# Patient Record
Sex: FEMALE | Race: WHITE | NOT HISPANIC OR LATINO | URBAN - METROPOLITAN AREA
[De-identification: names, ages, dates, MRNs, and addresses within clinical notes are randomized per-mention and may not be internally consistent; named-entity substitution may affect disease eponyms.]

---

## 2018-06-07 ENCOUNTER — EMERGENCY (EMERGENCY)
Facility: HOSPITAL | Age: 33
LOS: 1 days | Discharge: HOME | End: 2018-06-07
Attending: EMERGENCY MEDICINE | Admitting: EMERGENCY MEDICINE
Payer: COMMERCIAL

## 2018-06-07 VITALS
TEMPERATURE: 98 F | RESPIRATION RATE: 18 BRPM | DIASTOLIC BLOOD PRESSURE: 77 MMHG | WEIGHT: 151.02 LBS | OXYGEN SATURATION: 100 % | HEART RATE: 60 BPM | SYSTOLIC BLOOD PRESSURE: 122 MMHG

## 2018-06-07 VITALS
SYSTOLIC BLOOD PRESSURE: 100 MMHG | RESPIRATION RATE: 18 BRPM | DIASTOLIC BLOOD PRESSURE: 54 MMHG | TEMPERATURE: 98 F | HEART RATE: 50 BPM | OXYGEN SATURATION: 100 %

## 2018-06-07 LAB
ALBUMIN SERPL ELPH-MCNC: 4.8 G/DL — SIGNIFICANT CHANGE UP (ref 3.3–5)
ALP SERPL-CCNC: 42 U/L — SIGNIFICANT CHANGE UP (ref 40–120)
ALT FLD-CCNC: 13 U/L — SIGNIFICANT CHANGE UP (ref 10–45)
ANION GAP SERPL CALC-SCNC: 9 MMOL/L — SIGNIFICANT CHANGE UP (ref 5–17)
APPEARANCE UR: CLEAR — SIGNIFICANT CHANGE UP
AST SERPL-CCNC: 21 U/L — SIGNIFICANT CHANGE UP (ref 10–40)
BASOPHILS NFR BLD AUTO: 0.3 % — SIGNIFICANT CHANGE UP (ref 0–2)
BILIRUB SERPL-MCNC: 0.8 MG/DL — SIGNIFICANT CHANGE UP (ref 0.2–1.2)
BILIRUB UR-MCNC: NEGATIVE — SIGNIFICANT CHANGE UP
BUN SERPL-MCNC: 6 MG/DL — LOW (ref 7–23)
CALCIUM SERPL-MCNC: 9.5 MG/DL — SIGNIFICANT CHANGE UP (ref 8.4–10.5)
CHLORIDE SERPL-SCNC: 104 MMOL/L — SIGNIFICANT CHANGE UP (ref 96–108)
CO2 SERPL-SCNC: 27 MMOL/L — SIGNIFICANT CHANGE UP (ref 22–31)
COLOR SPEC: YELLOW — SIGNIFICANT CHANGE UP
CREAT SERPL-MCNC: 0.66 MG/DL — SIGNIFICANT CHANGE UP (ref 0.5–1.3)
DIFF PNL FLD: NEGATIVE — SIGNIFICANT CHANGE UP
EOSINOPHIL NFR BLD AUTO: 3.2 % — SIGNIFICANT CHANGE UP (ref 0–6)
GLUCOSE SERPL-MCNC: 94 MG/DL — SIGNIFICANT CHANGE UP (ref 70–99)
GLUCOSE UR QL: NEGATIVE — SIGNIFICANT CHANGE UP
HCT VFR BLD CALC: 38.7 % — SIGNIFICANT CHANGE UP (ref 34.5–45)
HGB BLD-MCNC: 13.4 G/DL — SIGNIFICANT CHANGE UP (ref 11.5–15.5)
KETONES UR-MCNC: NEGATIVE — SIGNIFICANT CHANGE UP
LEUKOCYTE ESTERASE UR-ACNC: NEGATIVE — SIGNIFICANT CHANGE UP
LYMPHOCYTES # BLD AUTO: 14.8 % — SIGNIFICANT CHANGE UP (ref 13–44)
MCHC RBC-ENTMCNC: 30.2 PG — SIGNIFICANT CHANGE UP (ref 27–34)
MCHC RBC-ENTMCNC: 34.6 G/DL — SIGNIFICANT CHANGE UP (ref 32–36)
MCV RBC AUTO: 87.2 FL — SIGNIFICANT CHANGE UP (ref 80–100)
MONOCYTES NFR BLD AUTO: 6.4 % — SIGNIFICANT CHANGE UP (ref 2–14)
NEUTROPHILS NFR BLD AUTO: 75.3 % — SIGNIFICANT CHANGE UP (ref 43–77)
NITRITE UR-MCNC: NEGATIVE — SIGNIFICANT CHANGE UP
PH UR: 6.5 — SIGNIFICANT CHANGE UP (ref 5–8)
PLATELET # BLD AUTO: 227 K/UL — SIGNIFICANT CHANGE UP (ref 150–400)
POTASSIUM SERPL-MCNC: 4.3 MMOL/L — SIGNIFICANT CHANGE UP (ref 3.5–5.3)
POTASSIUM SERPL-SCNC: 4.3 MMOL/L — SIGNIFICANT CHANGE UP (ref 3.5–5.3)
PROT SERPL-MCNC: 7.2 G/DL — SIGNIFICANT CHANGE UP (ref 6–8.3)
PROT UR-MCNC: NEGATIVE MG/DL — SIGNIFICANT CHANGE UP
RBC # BLD: 4.44 M/UL — SIGNIFICANT CHANGE UP (ref 3.8–5.2)
RBC # FLD: 12.5 % — SIGNIFICANT CHANGE UP (ref 10.3–16.9)
SODIUM SERPL-SCNC: 140 MMOL/L — SIGNIFICANT CHANGE UP (ref 135–145)
SP GR SPEC: <=1.005 — SIGNIFICANT CHANGE UP (ref 1–1.03)
UROBILINOGEN FLD QL: 0.2 E.U./DL — SIGNIFICANT CHANGE UP
WBC # BLD: 6.3 K/UL — SIGNIFICANT CHANGE UP (ref 3.8–10.5)
WBC # FLD AUTO: 6.3 K/UL — SIGNIFICANT CHANGE UP (ref 3.8–10.5)

## 2018-06-07 PROCEDURE — 36415 COLL VENOUS BLD VENIPUNCTURE: CPT

## 2018-06-07 PROCEDURE — 80053 COMPREHEN METABOLIC PANEL: CPT

## 2018-06-07 PROCEDURE — 85025 COMPLETE CBC W/AUTO DIFF WBC: CPT

## 2018-06-07 PROCEDURE — 99283 EMERGENCY DEPT VISIT LOW MDM: CPT | Mod: 25

## 2018-06-07 PROCEDURE — 93005 ELECTROCARDIOGRAM TRACING: CPT

## 2018-06-07 PROCEDURE — 99284 EMERGENCY DEPT VISIT MOD MDM: CPT | Mod: 25

## 2018-06-07 PROCEDURE — 81003 URINALYSIS AUTO W/O SCOPE: CPT

## 2018-06-07 PROCEDURE — 93010 ELECTROCARDIOGRAM REPORT: CPT

## 2018-06-07 PROCEDURE — 84702 CHORIONIC GONADOTROPIN TEST: CPT

## 2018-06-07 RX ORDER — OMEPRAZOLE 10 MG/1
1 CAPSULE, DELAYED RELEASE ORAL
Qty: 30 | Refills: 0 | OUTPATIENT
Start: 2018-06-07 | End: 2018-07-06

## 2018-06-07 RX ORDER — LIDOCAINE 4 G/100G
10 CREAM TOPICAL ONCE
Qty: 0 | Refills: 0 | Status: COMPLETED | OUTPATIENT
Start: 2018-06-07 | End: 2018-06-07

## 2018-06-07 RX ADMIN — Medication 30 MILLILITER(S): at 10:19

## 2018-06-07 RX ADMIN — LIDOCAINE 10 MILLILITER(S): 4 CREAM TOPICAL at 10:19

## 2018-06-07 NOTE — ED ADULT TRIAGE NOTE - CHIEF COMPLAINT QUOTE
c/o right rib pain accompanied with throat tightness since this morning. Pt states "feels like indigestion."

## 2018-06-07 NOTE — ED ADULT NURSE NOTE - OBJECTIVE STATEMENT
Pt requesting Med Eval.  Pt states "I've been feeling run down and fatigued for the past month and now I have a throbbing in my throat but it feels like indigestion."  PT denies N/V/D, SOB, Fevers, CP and Dizziness.

## 2018-06-07 NOTE — ED PROVIDER NOTE - OBJECTIVE STATEMENT
32 y/o f no pmh presents c/o discomfort in upper abdomen, radiating up her chest which began early this morning.  Pt also reporting pain under her rib on right side of upper abdomen, which she felt this morning, but has since improved.  Denies fever, chills, n/v/d, SOB, dysuria, all other ROS negative.

## 2018-06-07 NOTE — ED PROVIDER NOTE - MEDICAL DECISION MAKING DETAILS
32 y/o f upper abd pain this morning; currently abd nontender, EKG nonischemic, possible gastritis/GERD, unlikely ACS, will check labs, give GI cocktail and reassess.

## 2018-06-07 NOTE — ED PROVIDER NOTE - ATTENDING CONTRIBUTION TO CARE
34 y/o f with no sig. PMHX, not feeling self, less energy. Upper abd pain radiating to throat. No N/V. Recently diagnosed with fibroids on MRI of abd. Currently on exam, non tender. check lab and urine status.

## 2018-06-11 DIAGNOSIS — R10.11 RIGHT UPPER QUADRANT PAIN: ICD-10-CM

## 2018-06-11 DIAGNOSIS — R10.9 UNSPECIFIED ABDOMINAL PAIN: ICD-10-CM

## 2018-06-11 DIAGNOSIS — Z79.899 OTHER LONG TERM (CURRENT) DRUG THERAPY: ICD-10-CM

## 2018-08-24 ENCOUNTER — APPOINTMENT (OUTPATIENT)
Dept: SURGERY | Facility: CLINIC | Age: 33
End: 2018-08-24

## 2018-08-24 ENCOUNTER — OUTPATIENT (OUTPATIENT)
Dept: OUTPATIENT SERVICES | Facility: HOSPITAL | Age: 33
LOS: 1 days | End: 2018-08-24
Payer: COMMERCIAL

## 2018-08-24 ENCOUNTER — TRANSCRIPTION ENCOUNTER (OUTPATIENT)
Age: 33
End: 2018-08-24

## 2018-08-24 VITALS
SYSTOLIC BLOOD PRESSURE: 124 MMHG | OXYGEN SATURATION: 100 % | DIASTOLIC BLOOD PRESSURE: 80 MMHG | WEIGHT: 149.91 LBS | TEMPERATURE: 98 F | HEIGHT: 67 IN | RESPIRATION RATE: 16 BRPM | HEART RATE: 56 BPM

## 2018-08-24 DIAGNOSIS — Z41.9 ENCOUNTER FOR PROCEDURE FOR PURPOSES OTHER THAN REMEDYING HEALTH STATE, UNSPECIFIED: Chronic | ICD-10-CM

## 2018-08-24 DIAGNOSIS — N80.9 ENDOMETRIOSIS, UNSPECIFIED: ICD-10-CM

## 2018-08-24 DIAGNOSIS — Z01.818 ENCOUNTER FOR OTHER PREPROCEDURAL EXAMINATION: ICD-10-CM

## 2018-08-24 PROBLEM — Z00.00 ENCOUNTER FOR PREVENTIVE HEALTH EXAMINATION: Status: ACTIVE | Noted: 2018-08-24

## 2018-08-24 LAB
ALBUMIN SERPL ELPH-MCNC: 5.1 G/DL — HIGH (ref 3.3–5)
ALP SERPL-CCNC: 43 U/L — SIGNIFICANT CHANGE UP (ref 40–120)
ALT FLD-CCNC: 11 U/L — SIGNIFICANT CHANGE UP (ref 10–45)
ANION GAP SERPL CALC-SCNC: 15 MMOL/L — SIGNIFICANT CHANGE UP (ref 5–17)
APPEARANCE UR: CLEAR — SIGNIFICANT CHANGE UP
APTT BLD: 28.1 SEC — SIGNIFICANT CHANGE UP (ref 27.5–37.4)
AST SERPL-CCNC: 15 U/L — SIGNIFICANT CHANGE UP (ref 10–40)
BILIRUB SERPL-MCNC: 0.4 MG/DL — SIGNIFICANT CHANGE UP (ref 0.2–1.2)
BILIRUB UR-MCNC: NEGATIVE — SIGNIFICANT CHANGE UP
BLD GP AB SCN SERPL QL: NEGATIVE — SIGNIFICANT CHANGE UP
BUN SERPL-MCNC: 15 MG/DL — SIGNIFICANT CHANGE UP (ref 7–23)
CALCIUM SERPL-MCNC: 10 MG/DL — SIGNIFICANT CHANGE UP (ref 8.4–10.5)
CHLORIDE SERPL-SCNC: 101 MMOL/L — SIGNIFICANT CHANGE UP (ref 96–108)
CO2 SERPL-SCNC: 22 MMOL/L — SIGNIFICANT CHANGE UP (ref 22–31)
COLOR SPEC: YELLOW — SIGNIFICANT CHANGE UP
CREAT SERPL-MCNC: 0.74 MG/DL — SIGNIFICANT CHANGE UP (ref 0.5–1.3)
DIFF PNL FLD: NEGATIVE — SIGNIFICANT CHANGE UP
GLUCOSE SERPL-MCNC: 73 MG/DL — SIGNIFICANT CHANGE UP (ref 70–99)
GLUCOSE UR QL: NEGATIVE — SIGNIFICANT CHANGE UP
HCG SERPL-ACNC: <.1 MIU/ML — SIGNIFICANT CHANGE UP
HCT VFR BLD CALC: 40.1 % — SIGNIFICANT CHANGE UP (ref 34.5–45)
HGB BLD-MCNC: 13.3 G/DL — SIGNIFICANT CHANGE UP (ref 11.5–15.5)
INR BLD: 0.96 — SIGNIFICANT CHANGE UP (ref 0.88–1.16)
KETONES UR-MCNC: NEGATIVE — SIGNIFICANT CHANGE UP
LEUKOCYTE ESTERASE UR-ACNC: NEGATIVE — SIGNIFICANT CHANGE UP
MCHC RBC-ENTMCNC: 29.8 PG — SIGNIFICANT CHANGE UP (ref 27–34)
MCHC RBC-ENTMCNC: 33.2 G/DL — SIGNIFICANT CHANGE UP (ref 32–36)
MCV RBC AUTO: 89.9 FL — SIGNIFICANT CHANGE UP (ref 80–100)
NITRITE UR-MCNC: NEGATIVE — SIGNIFICANT CHANGE UP
PH UR: 6 — SIGNIFICANT CHANGE UP (ref 5–8)
PLATELET # BLD AUTO: 261 K/UL — SIGNIFICANT CHANGE UP (ref 150–400)
POTASSIUM SERPL-MCNC: 4.4 MMOL/L — SIGNIFICANT CHANGE UP (ref 3.5–5.3)
POTASSIUM SERPL-SCNC: 4.4 MMOL/L — SIGNIFICANT CHANGE UP (ref 3.5–5.3)
PROT SERPL-MCNC: 7.5 G/DL — SIGNIFICANT CHANGE UP (ref 6–8.3)
PROT UR-MCNC: NEGATIVE MG/DL — SIGNIFICANT CHANGE UP
PROTHROM AB SERPL-ACNC: 10.6 SEC — SIGNIFICANT CHANGE UP (ref 9.8–12.7)
RBC # BLD: 4.46 M/UL — SIGNIFICANT CHANGE UP (ref 3.8–5.2)
RBC # FLD: 12.4 % — SIGNIFICANT CHANGE UP (ref 10.3–16.9)
RH IG SCN BLD-IMP: POSITIVE — SIGNIFICANT CHANGE UP
SODIUM SERPL-SCNC: 138 MMOL/L — SIGNIFICANT CHANGE UP (ref 135–145)
SP GR SPEC: 1.02 — SIGNIFICANT CHANGE UP (ref 1–1.03)
UROBILINOGEN FLD QL: 0.2 E.U./DL — SIGNIFICANT CHANGE UP
WBC # BLD: 4.5 K/UL — SIGNIFICANT CHANGE UP (ref 3.8–10.5)
WBC # FLD AUTO: 4.5 K/UL — SIGNIFICANT CHANGE UP (ref 3.8–10.5)

## 2018-08-24 PROCEDURE — 93010 ELECTROCARDIOGRAM REPORT: CPT

## 2018-08-24 RX ORDER — VALACYCLOVIR 500 MG/1
0 TABLET, FILM COATED ORAL
Qty: 0 | Refills: 0 | COMMUNITY

## 2018-08-24 NOTE — H&P PST ADULT - NSANTHOSAYNRD_GEN_A_CORE
No. TERRIE screening performed.  STOP BANG Legend: 0-2 = LOW Risk; 3-4 = INTERMEDIATE Risk; 5-8 = HIGH Risk

## 2018-08-24 NOTE — H&P PST ADULT - ASSESSMENT
No medical contraindication to operative hysteroscopy and laparoscopic excision of endometriosis, pending normal labs.

## 2018-08-25 LAB — CANCER AG125 SERPL-ACNC: 5 U/ML — SIGNIFICANT CHANGE UP

## 2018-08-26 LAB
CULTURE RESULTS: NO GROWTH — SIGNIFICANT CHANGE UP
SPECIMEN SOURCE: SIGNIFICANT CHANGE UP

## 2018-08-27 LAB — ANTI-MULLERIAN HORMONE: 1.73 NG/ML — SIGNIFICANT CHANGE UP

## 2018-09-05 PROBLEM — B00.9 HERPESVIRAL INFECTION, UNSPECIFIED: Chronic | Status: ACTIVE | Noted: 2018-08-24

## 2018-09-05 PROBLEM — K21.9 GASTRO-ESOPHAGEAL REFLUX DISEASE WITHOUT ESOPHAGITIS: Chronic | Status: ACTIVE | Noted: 2018-08-24

## 2018-09-05 PROBLEM — G03.9 MENINGITIS, UNSPECIFIED: Chronic | Status: ACTIVE | Noted: 2018-08-24

## 2018-09-07 ENCOUNTER — OUTPATIENT (OUTPATIENT)
Dept: OUTPATIENT SERVICES | Facility: HOSPITAL | Age: 33
LOS: 1 days | Discharge: HOME | End: 2018-09-07
Payer: COMMERCIAL

## 2018-09-07 VITALS
RESPIRATION RATE: 16 BRPM | HEIGHT: 67 IN | TEMPERATURE: 99 F | SYSTOLIC BLOOD PRESSURE: 118 MMHG | HEART RATE: 48 BPM | WEIGHT: 147.93 LBS | OXYGEN SATURATION: 100 % | DIASTOLIC BLOOD PRESSURE: 56 MMHG

## 2018-09-07 DIAGNOSIS — Z41.9 ENCOUNTER FOR PROCEDURE FOR PURPOSES OTHER THAN REMEDYING HEALTH STATE, UNSPECIFIED: Chronic | ICD-10-CM

## 2018-09-07 LAB — HCG SERPL-ACNC: 17.9 MIU/ML — HIGH

## 2018-09-07 PROCEDURE — 86850 RBC ANTIBODY SCREEN: CPT

## 2018-09-07 PROCEDURE — 86901 BLOOD TYPING SEROLOGIC RH(D): CPT

## 2018-09-07 PROCEDURE — 86900 BLOOD TYPING SEROLOGIC ABO: CPT

## 2018-09-07 PROCEDURE — 36415 COLL VENOUS BLD VENIPUNCTURE: CPT

## 2018-09-07 PROCEDURE — 84702 CHORIONIC GONADOTROPIN TEST: CPT

## 2018-09-07 NOTE — PROGRESS NOTE ADULT - SUBJECTIVE AND OBJECTIVE BOX
Patient initially presented for scheduled diagnostic laparoscopy and excision of endometriosis for history of infertility. Initial urine pregnancy test was positive followed by a serum bHCG of 17. Surgery cancelled secondary to positive bHCG. Patient to return to [facility] ED after 48 hours for repeat bHCG. Pending results, will either follow up outpatient or be rescheduled for surgery.

## 2018-09-09 ENCOUNTER — EMERGENCY (EMERGENCY)
Facility: HOSPITAL | Age: 33
LOS: 1 days | Discharge: HOME | End: 2018-09-09
Attending: EMERGENCY MEDICINE | Admitting: EMERGENCY MEDICINE
Payer: COMMERCIAL

## 2018-09-09 VITALS
SYSTOLIC BLOOD PRESSURE: 111 MMHG | WEIGHT: 147.05 LBS | HEART RATE: 76 BPM | RESPIRATION RATE: 16 BRPM | OXYGEN SATURATION: 97 % | DIASTOLIC BLOOD PRESSURE: 69 MMHG | TEMPERATURE: 99 F

## 2018-09-09 DIAGNOSIS — Z41.9 ENCOUNTER FOR PROCEDURE FOR PURPOSES OTHER THAN REMEDYING HEALTH STATE, UNSPECIFIED: Chronic | ICD-10-CM

## 2018-09-09 DIAGNOSIS — O26.891 OTHER SPECIFIED PREGNANCY RELATED CONDITIONS, FIRST TRIMESTER: ICD-10-CM

## 2018-09-09 DIAGNOSIS — Z3A.00 WEEKS OF GESTATION OF PREGNANCY NOT SPECIFIED: ICD-10-CM

## 2018-09-09 DIAGNOSIS — Z79.899 OTHER LONG TERM (CURRENT) DRUG THERAPY: ICD-10-CM

## 2018-09-09 DIAGNOSIS — R10.9 UNSPECIFIED ABDOMINAL PAIN: ICD-10-CM

## 2018-09-09 DIAGNOSIS — R11.0 NAUSEA: ICD-10-CM

## 2018-09-09 LAB — HCG SERPL-ACNC: 77.3 MIU/ML — HIGH

## 2018-09-09 PROCEDURE — 99284 EMERGENCY DEPT VISIT MOD MDM: CPT

## 2018-09-09 PROCEDURE — 84702 CHORIONIC GONADOTROPIN TEST: CPT

## 2018-09-09 PROCEDURE — 99284 EMERGENCY DEPT VISIT MOD MDM: CPT | Mod: 25

## 2018-09-09 NOTE — ED PROVIDER NOTE - MEDICAL DECISION MAKING DETAILS
here for repeat hcg/ abd discomfort in early pregnancy.  hcg trending up but still very low.  no bleeding.  gyn consulted. did bedside us and felt too early to see anything on formal us.  to f/u in 2 d for repeat with her gyn or in ED

## 2018-09-09 NOTE — ED PROVIDER NOTE - OBJECTIVE STATEMENT
here for repeat hcg.  Was supposed to have laparoscopic surgery with gyn last week but prior to procedure had + pregnancy test.  Told to come back today to see if trending up or down to determine if surgery should continue or not.  Notes some mild cramping/nausea today.  No bleeding, fever.

## 2018-09-09 NOTE — ED ADULT NURSE NOTE - PMH
Acid reflux    Endometriosis    Herpes simplex  genital- last episode 8/20/2018  Meningitis  2011- viral meningitis

## 2018-09-09 NOTE — ED ADULT NURSE NOTE - NSIMPLEMENTINTERV_GEN_ALL_ED
Implemented All Universal Safety Interventions:  [place] to call system. Call bell, personal items and telephone within reach. Instruct patient to call for assistance. Room bathroom lighting operational. Non-slip footwear when patient is off stretcher. Physically safe environment: no spills, clutter or unnecessary equipment. Stretcher in lowest position, wheels locked, appropriate side rails in place.

## 2018-09-09 NOTE — CONSULT NOTE ADULT - SUBJECTIVE AND OBJECTIVE BOX
34 yo G1 at 3w6 days by LMP 8/13/18 presents for evaluation and repeat bHCG. The patient was scheduled for diagnostic hysteroscopy and laparoscopy on 9/7 with Dr. Dela Cruz. UPT and serum bHCG were both positive in Pre-OP and so the surgery was cancelled. Today the patient reports nausea and some cramping but overall feels well. She denies fever, chills, chest pain, SOB, abdominal pain, vomiting, and vaginal bleeding.       OBHx: G1 - current; LMP 8/13/18  GYN Hx: Endometriosis, fibroids  PMHx: denies   SHx: [place] teeth extraction  Meds: Denies   Allergies: NKDA     PHYSICAL EXAM:   Vital Signs Last 24 Hrs  T(C): 37 (09 Sep 2018 13:56), Max: 37 (09 Sep 2018 13:56)  T(F): 98.6 (09 Sep 2018 13:56), Max: 98.6 (09 Sep 2018 13:56)  HR: 76 (09 Sep 2018 13:56) (76 - 76)  BP: 111/69 (09 Sep 2018 13:56) (111/69 - 111/69)  BP(mean): --  RR: 16 (09 Sep 2018 13:56) (16 - 16)  SpO2: 97% (09 Sep 2018 13:56) (97% - 97%)    **************************  Constitutional: Alert & Oriented x3, No acute distress, resting comfortably   Gastrointestinal: soft, non tender, positive bowel sounds, no rebound or guarding   Pelvic exam: normal appearing external female genitalia, normal vagina, normal appearing cervix that appears closed without lesions, no abnormal discharge   Extremities: no calf tenderness or swelling      LABS:  HCG Quantitative, Serum: 77.3 mIU/mL (09-09 @ 14:40)  HCG Quantitative, Serum (09.07.18 @ 07:17)    HCG Quantitative, Serum: 17.9:     TVUS performed at bedside which demonstrates normal appearing uterus with closed cervix.

## 2018-09-09 NOTE — ED ADULT NURSE NOTE - CAS TRG GEN SKIN COLOR
Request for: fluoxetine 20 mg cap, take 1 cap daily  Last prescribed: 10/5/17   #30   Refills: 5    Refill denied.  Should have enough meds through 4/2018.  
Normal for race

## 2018-09-09 NOTE — ED ADULT TRIAGE NOTE - CHIEF COMPLAINT QUOTE
Pt was scheduled for surgery w Dr Dela Cruz but was found to be pregnant, here to have repeat HCG. Denies vag bleeding , reports pelvic pain 2/10. LMP 18. . Hx endometriosis.

## 2018-09-09 NOTE — CONSULT NOTE ADULT - ASSESSMENT
Assessment and Plan:   34 yo G1 at 3w6 by LMP presents for follow up bHCG.   - Appropriate interval rise in bHCG  - Recommend repeat bHCG in 48 hours  - Patient given strict return precautions: heavy vaginal bleeding, fever, severe abdominal pain   - Patient counseled that she is likely very early in her gestation and that there is still risk of miscarriage   - Recommend prenatal vitamins, abstinence from alcohol and raw fish/meat    Discussed with Dr. Dela Cruz

## 2018-09-09 NOTE — ED ADULT NURSE NOTE - OBJECTIVE STATEMENT
33y F, A&ox3, presents to ed for repeat hcg. states "I was supposed to get laproscopic surgery for my endometriosis but they found I was pregnant and I got blood work and told me to come back" no vaginal bleeding. minimal cramps as per pt. reports mild nausea. no urinary s/s. no cp, no sob, no fever, no chills. NAD. will continue to monitor.

## 2018-09-11 ENCOUNTER — EMERGENCY (EMERGENCY)
Facility: HOSPITAL | Age: 33
LOS: 1 days | Discharge: HOME | End: 2018-09-11
Attending: EMERGENCY MEDICINE | Admitting: EMERGENCY MEDICINE
Payer: COMMERCIAL

## 2018-09-11 VITALS
TEMPERATURE: 98 F | HEIGHT: 67 IN | HEART RATE: 58 BPM | WEIGHT: 147.93 LBS | SYSTOLIC BLOOD PRESSURE: 106 MMHG | RESPIRATION RATE: 18 BRPM | DIASTOLIC BLOOD PRESSURE: 62 MMHG | OXYGEN SATURATION: 100 %

## 2018-09-11 DIAGNOSIS — Z3A.01 LESS THAN 8 WEEKS GESTATION OF PREGNANCY: ICD-10-CM

## 2018-09-11 DIAGNOSIS — Z79.899 OTHER LONG TERM (CURRENT) DRUG THERAPY: ICD-10-CM

## 2018-09-11 DIAGNOSIS — Z41.9 ENCOUNTER FOR PROCEDURE FOR PURPOSES OTHER THAN REMEDYING HEALTH STATE, UNSPECIFIED: Chronic | ICD-10-CM

## 2018-09-11 DIAGNOSIS — R10.31 RIGHT LOWER QUADRANT PAIN: ICD-10-CM

## 2018-09-11 DIAGNOSIS — O26.891 OTHER SPECIFIED PREGNANCY RELATED CONDITIONS, FIRST TRIMESTER: ICD-10-CM

## 2018-09-11 DIAGNOSIS — R10.32 LEFT LOWER QUADRANT PAIN: ICD-10-CM

## 2018-09-11 PROBLEM — N80.9 ENDOMETRIOSIS, UNSPECIFIED: Chronic | Status: ACTIVE | Noted: 2018-09-09

## 2018-09-11 LAB — HCG SERPL-ACNC: 198.6 MIU/ML — HIGH

## 2018-09-11 PROCEDURE — 99283 EMERGENCY DEPT VISIT LOW MDM: CPT

## 2018-09-11 PROCEDURE — 36415 COLL VENOUS BLD VENIPUNCTURE: CPT

## 2018-09-11 PROCEDURE — 99284 EMERGENCY DEPT VISIT MOD MDM: CPT | Mod: 25

## 2018-09-11 PROCEDURE — 84702 CHORIONIC GONADOTROPIN TEST: CPT

## 2018-09-11 NOTE — ED ADULT TRIAGE NOTE - CHIEF COMPLAINT QUOTE
pt was for surgery last Friday for endometriosis was canceled to due positive in pregnancy test, sent here by her doctor for beta hcg blood exam, LMP 08/13/18

## 2018-09-11 NOTE — ED PROVIDER NOTE - MEDICAL DECISION MAKING DETAILS
34 y/o f presents for f/u hcg after incidental finding pre-operatively; no abd tenderness, hcg jimena from 77 to 198 in 2 days.  Discussed with GYN, hcg rising appropriately, still early in pregnancy, will d/c to f/u with Dr. Dela Cruz in 1 week, return to ED if develops pain and/or vaginal bleeding.

## 2018-09-11 NOTE — ED ADULT NURSE NOTE - OBJECTIVE STATEMENT
Pt is here for repeat beta HCG - was supposed to have a surgery and was found to be pregnant. . Endorses pelvic pain, but denies any vag bleeding or spotting.

## 2018-09-11 NOTE — ED ADULT NURSE NOTE - NSIMPLEMENTINTERV_GEN_ALL_ED
Implemented All Universal Safety Interventions:  [place] to call system. Call bell, personal items and telephone within reach. Instruct patient to call for assistance. Room bathroom lighting operational. Non-slip footwear when patient is off stretcher. Physically safe environment: no spills, clutter or unnecessary equipment. Stretcher in lowest position, wheels locked, appropriate side rails in place.

## 2018-09-11 NOTE — ED PROVIDER NOTE - ATTENDING CONTRIBUTION TO CARE
32 yo F presents stating that she is following up for a repeat quant beta hcg. Pt reporting mild b/l cramping lower abd pain which has been ongoing for a few days. Denies fever, chills, n/v, dysuria, vaginal bleeding, all other ROS negative. Pt AAO, NAD, abd: soft/ NT.  Pt's beta hcg jimena from 77 to 198 in 2 days. Discussed with GYN, hcg rising appropriately, still early in pregnancy, will d/c to f/u with Dr. Dela Cruz in 1 week, return to ED if develops pain and/or vaginal bleeding. VSS.

## 2018-09-11 NOTE — ED PROVIDER NOTE - OBJECTIVE STATEMENT
34 y/o f presents stating she is following up for a repeat pregnancy hormone level.  Pt was incidentally positive last week prior to undergoing laparoscopic hysteroscopy for endometriosis w/u.  Hormone level slowly rising and pt returning today as asked.  Pt reporting mild b/l cramping lower abd pain which is not new.  Denies fever, chills, n/v, dysuria, vaginal bleeding, all other ROS negative.

## 2018-09-13 NOTE — H&P PST ADULT - VENOUS THROMBOEMBOLISM FOR WOMEN ONLY
Paige Davis's goals for this visit include:   Chief Complaint   Patient presents with     Consult     arm pain  on right side      She requests these members of her care team be copied on today's visit information: PCP    PCP: Vinayak Huerta    Referring Provider:  Jackelyn Escalona [place]  [address] [place] [address]    BP (!) 155/112 (BP Location: Left arm, Patient Position: Sitting, Cuff Size: Adult Regular)  Pulse 90  Ht 1.524 m (5')  Wt 83.5 kg (184 lb)  SpO2 96%  BMI 35.94 kg/m2    Do you need any medication refills at today's visit? n   none

## 2018-09-24 NOTE — ED PROVIDER NOTE - CHIEF COMPLAINT
Fax from a company called [employer] asking to refill her meds / called pt to see if this is something she would like us to do/ left message for her to call back The patient is a 33y Female complaining of abdominal pain.

## 2018-10-04 ENCOUNTER — TRANSCRIPTION ENCOUNTER (OUTPATIENT)
Age: 33
End: 2018-10-04

## 2018-11-02 NOTE — ED PROVIDER NOTE - PSYCHIATRIC, MLM
Alert and oriented to person, place, time/situation. normal mood and affect. no apparent risk to self or others. Follow instructions as per MD

## 2018-12-21 DIAGNOSIS — N80.9 ENDOMETRIOSIS, UNSPECIFIED: ICD-10-CM

## 2019-05-20 ENCOUNTER — OUTPATIENT (OUTPATIENT)
Dept: OUTPATIENT SERVICES | Facility: HOSPITAL | Age: 34
LOS: 1 days | End: 2019-05-20
Payer: COMMERCIAL

## 2019-05-20 DIAGNOSIS — Z3A.00 WEEKS OF GESTATION OF PREGNANCY NOT SPECIFIED: ICD-10-CM

## 2019-05-20 DIAGNOSIS — O26.899 OTHER SPECIFIED PREGNANCY RELATED CONDITIONS, UNSPECIFIED TRIMESTER: ICD-10-CM

## 2019-05-20 DIAGNOSIS — Z41.9 ENCOUNTER FOR PROCEDURE FOR PURPOSES OTHER THAN REMEDYING HEALTH STATE, UNSPECIFIED: Chronic | ICD-10-CM

## 2019-05-20 PROCEDURE — 99214 OFFICE O/P EST MOD 30 MIN: CPT

## 2019-05-29 ENCOUNTER — INPATIENT (INPATIENT)
Facility: HOSPITAL | Age: 34
LOS: 2 days | Discharge: HOME | End: 2019-06-01
Attending: OBSTETRICS & GYNECOLOGY | Admitting: OBSTETRICS & GYNECOLOGY
Payer: COMMERCIAL

## 2019-05-29 ENCOUNTER — RESULT REVIEW (OUTPATIENT)
Age: 34
End: 2019-05-29

## 2019-05-29 VITALS — WEIGHT: 178.57 LBS | HEIGHT: 67 IN

## 2019-05-29 DIAGNOSIS — Z41.9 ENCOUNTER FOR PROCEDURE FOR PURPOSES OTHER THAN REMEDYING HEALTH STATE, UNSPECIFIED: Chronic | ICD-10-CM

## 2019-05-29 DIAGNOSIS — O26.899 OTHER SPECIFIED PREGNANCY RELATED CONDITIONS, UNSPECIFIED TRIMESTER: ICD-10-CM

## 2019-05-29 DIAGNOSIS — Z3A.00 WEEKS OF GESTATION OF PREGNANCY NOT SPECIFIED: ICD-10-CM

## 2019-05-29 LAB
BASOPHILS # BLD AUTO: 0.02 K/UL — SIGNIFICANT CHANGE UP (ref 0–0.2)
BASOPHILS NFR BLD AUTO: 0.3 % — SIGNIFICANT CHANGE UP (ref 0–2)
EOSINOPHIL # BLD AUTO: 0.04 K/UL — SIGNIFICANT CHANGE UP (ref 0–0.5)
EOSINOPHIL NFR BLD AUTO: 0.5 % — SIGNIFICANT CHANGE UP (ref 0–6)
HCT VFR BLD CALC: 39.7 % — SIGNIFICANT CHANGE UP (ref 34.5–45)
HGB BLD-MCNC: 13.3 G/DL — SIGNIFICANT CHANGE UP (ref 11.5–15.5)
IMM GRANULOCYTES NFR BLD AUTO: 0.5 % — SIGNIFICANT CHANGE UP (ref 0–1.5)
LYMPHOCYTES # BLD AUTO: 2.01 K/UL — SIGNIFICANT CHANGE UP (ref 1–3.3)
LYMPHOCYTES # BLD AUTO: 27.1 % — SIGNIFICANT CHANGE UP (ref 13–44)
MCHC RBC-ENTMCNC: 30.2 PG — SIGNIFICANT CHANGE UP (ref 27–34)
MCHC RBC-ENTMCNC: 33.5 GM/DL — SIGNIFICANT CHANGE UP (ref 32–36)
MCV RBC AUTO: 90.2 FL — SIGNIFICANT CHANGE UP (ref 80–100)
MONOCYTES # BLD AUTO: 0.41 K/UL — SIGNIFICANT CHANGE UP (ref 0–0.9)
MONOCYTES NFR BLD AUTO: 5.5 % — SIGNIFICANT CHANGE UP (ref 2–14)
NEUTROPHILS # BLD AUTO: 4.91 K/UL — SIGNIFICANT CHANGE UP (ref 1.8–7.4)
NEUTROPHILS NFR BLD AUTO: 66.1 % — SIGNIFICANT CHANGE UP (ref 43–77)
NRBC # BLD: 0 /100 WBCS — SIGNIFICANT CHANGE UP (ref 0–0)
PLATELET # BLD AUTO: 160 K/UL — SIGNIFICANT CHANGE UP (ref 150–400)
RBC # BLD: 4.4 M/UL — SIGNIFICANT CHANGE UP (ref 3.8–5.2)
RBC # FLD: 13.6 % — SIGNIFICANT CHANGE UP (ref 10.3–14.5)
T PALLIDUM AB TITR SER: NEGATIVE — SIGNIFICANT CHANGE UP
WBC # BLD: 7.43 K/UL — SIGNIFICANT CHANGE UP (ref 3.8–10.5)
WBC # FLD AUTO: 7.43 K/UL — SIGNIFICANT CHANGE UP (ref 3.8–10.5)

## 2019-05-29 RX ORDER — SODIUM CHLORIDE 9 MG/ML
1000 INJECTION, SOLUTION INTRAVENOUS
Refills: 0 | Status: DISCONTINUED | OUTPATIENT
Start: 2019-05-29 | End: 2019-05-29

## 2019-05-29 RX ORDER — NALOXONE HYDROCHLORIDE 4 MG/.1ML
0.1 SPRAY NASAL
Refills: 0 | Status: DISCONTINUED | OUTPATIENT
Start: 2019-05-29 | End: 2019-05-29

## 2019-05-29 RX ORDER — GLYCERIN ADULT
1 SUPPOSITORY, RECTAL RECTAL AT BEDTIME
Refills: 0 | Status: DISCONTINUED | OUTPATIENT
Start: 2019-05-29 | End: 2019-06-01

## 2019-05-29 RX ORDER — CITRIC ACID/SODIUM CITRATE 300-500 MG
30 SOLUTION, ORAL ORAL ONCE
Refills: 0 | Status: COMPLETED | OUTPATIENT
Start: 2019-05-29 | End: 2019-05-29

## 2019-05-29 RX ORDER — SIMETHICONE 80 MG/1
80 TABLET, CHEWABLE ORAL EVERY 4 HOURS
Refills: 0 | Status: DISCONTINUED | OUTPATIENT
Start: 2019-05-29 | End: 2019-06-01

## 2019-05-29 RX ORDER — TETANUS TOXOID, REDUCED DIPHTHERIA TOXOID AND ACELLULAR PERTUSSIS VACCINE, ADSORBED 5; 2.5; 8; 8; 2.5 [IU]/.5ML; [IU]/.5ML; UG/.5ML; UG/.5ML; UG/.5ML
0.5 SUSPENSION INTRAMUSCULAR ONCE
Refills: 0 | Status: DISCONTINUED | OUTPATIENT
Start: 2019-05-29 | End: 2019-06-01

## 2019-05-29 RX ORDER — DIPHENHYDRAMINE HCL 50 MG
25 CAPSULE ORAL EVERY 6 HOURS
Refills: 0 | Status: DISCONTINUED | OUTPATIENT
Start: 2019-05-29 | End: 2019-06-01

## 2019-05-29 RX ORDER — OXYTOCIN 10 UNIT/ML
333.33 VIAL (ML) INJECTION
Qty: 20 | Refills: 0 | Status: COMPLETED | OUTPATIENT
Start: 2019-05-29 | End: 2019-05-29

## 2019-05-29 RX ORDER — OXYTOCIN 10 UNIT/ML
333.33 VIAL (ML) INJECTION
Qty: 20 | Refills: 0 | Status: DISCONTINUED | OUTPATIENT
Start: 2019-05-29 | End: 2019-05-29

## 2019-05-29 RX ORDER — SODIUM CHLORIDE 9 MG/ML
1000 INJECTION, SOLUTION INTRAVENOUS ONCE
Refills: 0 | Status: DISCONTINUED | OUTPATIENT
Start: 2019-05-29 | End: 2019-05-29

## 2019-05-29 RX ORDER — LANOLIN
1 OINTMENT (GRAM) TOPICAL EVERY 6 HOURS
Refills: 0 | Status: DISCONTINUED | OUTPATIENT
Start: 2019-05-29 | End: 2019-06-01

## 2019-05-29 RX ORDER — METOCLOPRAMIDE HCL 10 MG
10 TABLET ORAL ONCE
Refills: 0 | Status: DISCONTINUED | OUTPATIENT
Start: 2019-05-29 | End: 2019-05-29

## 2019-05-29 RX ORDER — HEPARIN SODIUM 5000 [USP'U]/ML
5000 INJECTION INTRAVENOUS; SUBCUTANEOUS EVERY 12 HOURS
Refills: 0 | Status: DISCONTINUED | OUTPATIENT
Start: 2019-05-29 | End: 2019-06-01

## 2019-05-29 RX ORDER — SODIUM CHLORIDE 9 MG/ML
1000 INJECTION, SOLUTION INTRAVENOUS
Refills: 0 | Status: DISCONTINUED | OUTPATIENT
Start: 2019-05-29 | End: 2019-06-01

## 2019-05-29 RX ORDER — KETOROLAC TROMETHAMINE 30 MG/ML
30 SYRINGE (ML) INJECTION EVERY 6 HOURS
Refills: 0 | Status: DISCONTINUED | OUTPATIENT
Start: 2019-05-29 | End: 2019-05-30

## 2019-05-29 RX ORDER — MORPHINE SULFATE 50 MG/1
0.3 CAPSULE, EXTENDED RELEASE ORAL ONCE
Refills: 0 | Status: DISCONTINUED | OUTPATIENT
Start: 2019-05-29 | End: 2019-05-29

## 2019-05-29 RX ORDER — MAGNESIUM HYDROXIDE 400 MG/1
30 TABLET, CHEWABLE ORAL
Refills: 0 | Status: DISCONTINUED | OUTPATIENT
Start: 2019-05-29 | End: 2019-06-01

## 2019-05-29 RX ORDER — OXYCODONE HYDROCHLORIDE 5 MG/1
5 TABLET ORAL ONCE
Refills: 0 | Status: DISCONTINUED | OUTPATIENT
Start: 2019-05-29 | End: 2019-06-01

## 2019-05-29 RX ORDER — IBUPROFEN 200 MG
600 TABLET ORAL EVERY 6 HOURS
Refills: 0 | Status: COMPLETED | OUTPATIENT
Start: 2019-05-29 | End: 2020-04-26

## 2019-05-29 RX ORDER — CEFAZOLIN SODIUM 1 G
2000 VIAL (EA) INJECTION ONCE
Refills: 0 | Status: COMPLETED | OUTPATIENT
Start: 2019-05-29 | End: 2019-05-29

## 2019-05-29 RX ORDER — FOLIC ACID 0.8 MG
1 TABLET ORAL DAILY
Refills: 0 | Status: DISCONTINUED | OUTPATIENT
Start: 2019-05-29 | End: 2019-06-01

## 2019-05-29 RX ORDER — DOCUSATE SODIUM 100 MG
100 CAPSULE ORAL
Refills: 0 | Status: DISCONTINUED | OUTPATIENT
Start: 2019-05-29 | End: 2019-06-01

## 2019-05-29 RX ORDER — ACETAMINOPHEN 500 MG
975 TABLET ORAL EVERY 6 HOURS
Refills: 0 | Status: DISCONTINUED | OUTPATIENT
Start: 2019-05-29 | End: 2019-06-01

## 2019-05-29 RX ORDER — OXYCODONE HYDROCHLORIDE 5 MG/1
5 TABLET ORAL
Refills: 0 | Status: DISCONTINUED | OUTPATIENT
Start: 2019-05-29 | End: 2019-06-01

## 2019-05-29 RX ORDER — ACETAMINOPHEN 500 MG
1000 TABLET ORAL ONCE
Refills: 0 | Status: COMPLETED | OUTPATIENT
Start: 2019-05-29 | End: 2019-05-29

## 2019-05-29 RX ORDER — ONDANSETRON 8 MG/1
4 TABLET, FILM COATED ORAL EVERY 6 HOURS
Refills: 0 | Status: DISCONTINUED | OUTPATIENT
Start: 2019-05-29 | End: 2019-05-29

## 2019-05-29 RX ORDER — FERROUS SULFATE 325(65) MG
325 TABLET ORAL DAILY
Refills: 0 | Status: DISCONTINUED | OUTPATIENT
Start: 2019-05-29 | End: 2019-06-01

## 2019-05-29 RX ADMIN — Medication 30 MILLIGRAM(S): at 22:00

## 2019-05-29 RX ADMIN — Medication 400 MILLIGRAM(S): at 11:11

## 2019-05-29 RX ADMIN — HEPARIN SODIUM 5000 UNIT(S): 5000 INJECTION INTRAVENOUS; SUBCUTANEOUS at 22:17

## 2019-05-29 RX ADMIN — Medication 30 MILLIGRAM(S): at 21:20

## 2019-05-29 RX ADMIN — Medication 100 MILLIGRAM(S): at 09:01

## 2019-05-29 RX ADMIN — Medication 30 MILLIGRAM(S): at 15:15

## 2019-05-29 RX ADMIN — Medication 1000 MILLIUNIT(S)/MIN: at 15:20

## 2019-05-29 RX ADMIN — Medication 30 MILLILITER(S): at 09:01

## 2019-05-29 RX ADMIN — Medication 30 MILLIGRAM(S): at 15:55

## 2019-05-29 RX ADMIN — Medication 1000 MILLIGRAM(S): at 12:00

## 2019-05-30 LAB
BASOPHILS # BLD AUTO: 0.02 K/UL — SIGNIFICANT CHANGE UP (ref 0–0.2)
BASOPHILS NFR BLD AUTO: 0.2 % — SIGNIFICANT CHANGE UP (ref 0–2)
EOSINOPHIL # BLD AUTO: 0.02 K/UL — SIGNIFICANT CHANGE UP (ref 0–0.5)
EOSINOPHIL NFR BLD AUTO: 0.2 % — SIGNIFICANT CHANGE UP (ref 0–6)
HCT VFR BLD CALC: 33.1 % — LOW (ref 34.5–45)
HGB BLD-MCNC: 10.6 G/DL — LOW (ref 11.5–15.5)
IMM GRANULOCYTES NFR BLD AUTO: 0.2 % — SIGNIFICANT CHANGE UP (ref 0–1.5)
LYMPHOCYTES # BLD AUTO: 1.28 K/UL — SIGNIFICANT CHANGE UP (ref 1–3.3)
LYMPHOCYTES # BLD AUTO: 15.2 % — SIGNIFICANT CHANGE UP (ref 13–44)
MCHC RBC-ENTMCNC: 29.4 PG — SIGNIFICANT CHANGE UP (ref 27–34)
MCHC RBC-ENTMCNC: 32 GM/DL — SIGNIFICANT CHANGE UP (ref 32–36)
MCV RBC AUTO: 91.7 FL — SIGNIFICANT CHANGE UP (ref 80–100)
MONOCYTES # BLD AUTO: 0.36 K/UL — SIGNIFICANT CHANGE UP (ref 0–0.9)
MONOCYTES NFR BLD AUTO: 4.3 % — SIGNIFICANT CHANGE UP (ref 2–14)
NEUTROPHILS # BLD AUTO: 6.74 K/UL — SIGNIFICANT CHANGE UP (ref 1.8–7.4)
NEUTROPHILS NFR BLD AUTO: 79.9 % — HIGH (ref 43–77)
NRBC # BLD: 0 /100 WBCS — SIGNIFICANT CHANGE UP (ref 0–0)
PLATELET # BLD AUTO: 127 K/UL — LOW (ref 150–400)
RBC # BLD: 3.61 M/UL — LOW (ref 3.8–5.2)
RBC # FLD: 13.7 % — SIGNIFICANT CHANGE UP (ref 10.3–14.5)
WBC # BLD: 8.44 K/UL — SIGNIFICANT CHANGE UP (ref 3.8–10.5)
WBC # FLD AUTO: 8.44 K/UL — SIGNIFICANT CHANGE UP (ref 3.8–10.5)

## 2019-05-30 RX ORDER — IBUPROFEN 200 MG
600 TABLET ORAL EVERY 6 HOURS
Refills: 0 | Status: DISCONTINUED | OUTPATIENT
Start: 2019-05-30 | End: 2019-06-01

## 2019-05-30 RX ADMIN — Medication 30 MILLIGRAM(S): at 01:49

## 2019-05-30 RX ADMIN — Medication 975 MILLIGRAM(S): at 14:15

## 2019-05-30 RX ADMIN — Medication 30 MILLIGRAM(S): at 02:38

## 2019-05-30 RX ADMIN — Medication 100 MILLIGRAM(S): at 13:28

## 2019-05-30 RX ADMIN — Medication 975 MILLIGRAM(S): at 13:29

## 2019-05-30 RX ADMIN — Medication 325 MILLIGRAM(S): at 13:28

## 2019-05-30 RX ADMIN — Medication 975 MILLIGRAM(S): at 19:31

## 2019-05-30 RX ADMIN — HEPARIN SODIUM 5000 UNIT(S): 5000 INJECTION INTRAVENOUS; SUBCUTANEOUS at 13:27

## 2019-05-30 RX ADMIN — Medication 975 MILLIGRAM(S): at 06:30

## 2019-05-30 RX ADMIN — Medication 975 MILLIGRAM(S): at 07:41

## 2019-05-30 RX ADMIN — Medication 600 MILLIGRAM(S): at 18:00

## 2019-05-30 RX ADMIN — Medication 600 MILLIGRAM(S): at 17:20

## 2019-05-30 RX ADMIN — Medication 30 MILLIGRAM(S): at 10:12

## 2019-05-30 RX ADMIN — Medication 30 MILLIGRAM(S): at 11:00

## 2019-05-30 RX ADMIN — Medication 975 MILLIGRAM(S): at 20:51

## 2019-05-30 RX ADMIN — Medication 1 TABLET(S): at 13:28

## 2019-05-30 RX ADMIN — Medication 1 MILLIGRAM(S): at 13:28

## 2019-05-30 NOTE — PROGRESS NOTE ADULT - SUBJECTIVE AND OBJECTIVE BOX
Patient evaluated at bedside.   She reports pain is well controlled.  She denies headache, dizziness, chest pain, palpitations, shortness of breathe, nausea, vomiting or heavy vaginal bleeding.  She has been ambulating without assistance, voiding spontaneously, tolerating regular diet and is breastfeeding. Patient reports that she feels very well overall.     Physical Exam:  Vital Signs Last 24 Hrs  T(C): 36.5 (30 May 2019 06:00), Max: 36.7 (29 May 2019 12:15)  T(F): 97.7 (30 May 2019 06:00), Max: 98.1 (29 May 2019 12:15)  HR: 60 (30 May 2019 06:00) (50 - 60)  BP: 104/62 (30 May 2019 06:00) (90/54 - 137/58)  BP(mean): 84 (29 May 2019 12:00) (74 - 86)  RR: 18 (30 May 2019 06:00) (14 - 20)  SpO2: 96% (30 May 2019 06:00) (95% - 99%)    05-29 @ 07:01  -  05-30 @ 07:00  --------------------------------------------------------  IN: 1600 mL / OUT: 2200 mL / NET: -600 mL        GA: NAD, resting comfortably   Abd: + BS, soft, appropriately tender, moderately distended, no rebound or guarding, uterus firm at midline,  fb below umbilicus  Incision: well approximated, no erythema or discharge  : lochia WNL  Extremities: no swelling or calf tenderness                            10.6   8.44  )-----------( 127      ( 30 May 2019 07:59 )             33.1

## 2019-05-30 NOTE — LACTATION INITIAL EVALUATION - NS LACT CON REASON FOR REQ
c/o sore, painful nipples/primaparous mom/Met Dyad and FOB. Parents report they are very motivated to breastfeed. Pt reports "my nipples are sore and the left breast is hard for her to stay on". L nipple noted to be dimpled and shorter than the right nipple. Taught pt how to hand express and how to gently roll and stimulate her nipple to leopoldo it further prior to latching. Also taught pt how to sandwich her breast to facilitate a deeper latch. Pt has visual bruising (hickey type bruises) in x3 spots on the R areola. It appears the baby missed the nipple completely and only latched onto the areola where the pt is c/o of pain. Nipple CDI, but both nipples are short and less elastic, so pt reports "the stretching is making me sore". Reviewed good skin hygiene and maintenance b/w feeds and provided saline soaks and instructions for saline soaks after nursing while she is healing. With baby in SSC and proper positioning, baby gaped and latched deeply bilaterally with deep rhythmic sucks and appropriate pauses. Even with the damage to pt's areolas, pt reported much less pain and a tolerable latch bilaterally during this feed. Baby nursed on the R breast for approximately 15-20 minutes and approximately 30-45 minutes on the L breast. Baby spontaneously released the breast at the end of the feed and maternal nipple came out round and CDI. Baby fell asleep after nursing on pt's chest in proper SSC with relaxed open hand body posture, and then pt fell asleep as well. FOB at BS to keep an eye on them. Taught bf'ing basics, position/latch, expected input/output. Baby born @0951, Primary C/S (d/t herpes outbreak), 41.2 wks, . x4 voids/x3 poops since birth (breast only), WNL. Wt loss=4.14%, WNL. Encouraged as much SSC and rooming in as possible aiming to FOD of baby when giving feeding cues. Provided breast shells with proper instruction of use for pt to wear while her nipples are healing. Parents plan to seek out [facility] LC support s/p D/C. Questions answered, parents reassured. BS RN aware of consult.

## 2019-05-30 NOTE — LACTATION INITIAL EVALUATION - PRO FEEDING CONCERNS YN OB
yes/[place] less elastic nipples with some nipple/areola damage already, but with proper deep sandwiching of the breast, baby was able to latch well and sustain the latch.

## 2019-05-31 RX ADMIN — Medication 975 MILLIGRAM(S): at 13:55

## 2019-05-31 RX ADMIN — Medication 975 MILLIGRAM(S): at 18:30

## 2019-05-31 RX ADMIN — Medication 325 MILLIGRAM(S): at 12:56

## 2019-05-31 RX ADMIN — Medication 600 MILLIGRAM(S): at 14:33

## 2019-05-31 RX ADMIN — SIMETHICONE 80 MILLIGRAM(S): 80 TABLET, CHEWABLE ORAL at 03:17

## 2019-05-31 RX ADMIN — Medication 975 MILLIGRAM(S): at 01:30

## 2019-05-31 RX ADMIN — HEPARIN SODIUM 5000 UNIT(S): 5000 INJECTION INTRAVENOUS; SUBCUTANEOUS at 12:56

## 2019-05-31 RX ADMIN — Medication 1 MILLIGRAM(S): at 12:55

## 2019-05-31 RX ADMIN — Medication 975 MILLIGRAM(S): at 12:55

## 2019-05-31 RX ADMIN — Medication 600 MILLIGRAM(S): at 20:30

## 2019-05-31 RX ADMIN — Medication 100 MILLIGRAM(S): at 20:28

## 2019-05-31 RX ADMIN — Medication 600 MILLIGRAM(S): at 03:14

## 2019-05-31 RX ADMIN — Medication 975 MILLIGRAM(S): at 06:39

## 2019-05-31 RX ADMIN — Medication 975 MILLIGRAM(S): at 00:44

## 2019-05-31 RX ADMIN — Medication 975 MILLIGRAM(S): at 07:24

## 2019-05-31 RX ADMIN — Medication 600 MILLIGRAM(S): at 03:44

## 2019-05-31 RX ADMIN — SIMETHICONE 80 MILLIGRAM(S): 80 TABLET, CHEWABLE ORAL at 13:00

## 2019-05-31 RX ADMIN — Medication 1 TABLET(S): at 12:56

## 2019-05-31 RX ADMIN — Medication 600 MILLIGRAM(S): at 08:32

## 2019-05-31 RX ADMIN — Medication 600 MILLIGRAM(S): at 09:30

## 2019-05-31 RX ADMIN — HEPARIN SODIUM 5000 UNIT(S): 5000 INJECTION INTRAVENOUS; SUBCUTANEOUS at 00:52

## 2019-05-31 RX ADMIN — Medication 600 MILLIGRAM(S): at 21:30

## 2019-05-31 RX ADMIN — Medication 600 MILLIGRAM(S): at 15:30

## 2019-05-31 RX ADMIN — Medication 975 MILLIGRAM(S): at 17:36

## 2019-05-31 NOTE — PROGRESS NOTE ADULT - SUBJECTIVE AND OBJECTIVE BOX
Patient evaluated at bedside.   She reports pain is well controlled with po pain   She denies headache, dizziness, chest pain, palpitations, shortness of breathe, nausea, vomiting or heavy vaginal bleeding.  She has been ambulating without assistance, voiding spontaneously, passing gas, tolerating regular diet and is breastfeeding.    Physical Exam:  Vital Signs Last 24 Hrs  T(C): 36.4 (31 May 2019 02:22), Max: 36.8 (30 May 2019 10:00)  T(F): 97.6 (31 May 2019 02:22), Max: 98.3 (30 May 2019 18:00)  HR: 60 (31 May 2019 02:22) (56 - 62)  BP: 134/80 (31 May 2019 02:22) (101/63 - 134/80)  BP(mean): --  RR: 18 (31 May 2019 02:22) (18 - 18)  SpO2: 98% (31 May 2019 02:22) (97% - 98%)    GA: NAD, A+0 x 3s  Abd: + BS, soft, nontender, nondistended, no rebound or guarding, incision clean, dry and intact, uterus firm at midline, 1 fb below umbilicus  : lochia WNL  Extremities: no swelling or calf tenderness, reflexes +2 bilaterally                            10.6   8.44  )-----------( 127      ( 30 May 2019 07:59 )             33.1

## 2019-05-31 NOTE — PROGRESS NOTE ADULT - SUBJECTIVE AND OBJECTIVE BOX
Patient evaluated at bedside.   She reports pain is well controlled.  She denies headache, dizziness, chest pain, palpitations, shortness of breathe, nausea, vomiting or heavy vaginal bleeding.  She has been ambulating without assistance, voiding spontaneously, passing gas, tolerating regular diet and is breastfeeding.    Physical Exam:  GA: NAD, resting comfortably   Abd: + BS, soft, appropriately tender, mildly distended, no rebound or guarding, uterus firm at midline,  fb below umbilicus  Incision: well approximated, no erythema or discharge  : lochia WNL  Extremities: no swelling or calf tenderness                            10.6   8.44  )-----------( 127      ( 30 May 2019 07:59 )             33.1

## 2019-06-01 ENCOUNTER — TRANSCRIPTION ENCOUNTER (OUTPATIENT)
Age: 34
End: 2019-06-01

## 2019-06-01 VITALS
TEMPERATURE: 97 F | RESPIRATION RATE: 18 BRPM | OXYGEN SATURATION: 99 % | HEART RATE: 56 BPM | DIASTOLIC BLOOD PRESSURE: 80 MMHG | SYSTOLIC BLOOD PRESSURE: 125 MMHG

## 2019-06-01 PROCEDURE — 85025 COMPLETE CBC W/AUTO DIFF WBC: CPT

## 2019-06-01 PROCEDURE — 86850 RBC ANTIBODY SCREEN: CPT

## 2019-06-01 PROCEDURE — 86901 BLOOD TYPING SEROLOGIC RH(D): CPT

## 2019-06-01 PROCEDURE — C1889: CPT

## 2019-06-01 PROCEDURE — 86780 TREPONEMA PALLIDUM: CPT

## 2019-06-01 PROCEDURE — 99214 OFFICE O/P EST MOD 30 MIN: CPT

## 2019-06-01 PROCEDURE — 88307 TISSUE EXAM BY PATHOLOGIST: CPT

## 2019-06-01 PROCEDURE — 86900 BLOOD TYPING SEROLOGIC ABO: CPT

## 2019-06-01 PROCEDURE — C1765: CPT

## 2019-06-01 PROCEDURE — 36415 COLL VENOUS BLD VENIPUNCTURE: CPT

## 2019-06-01 RX ORDER — VALACYCLOVIR 500 MG/1
0 TABLET, FILM COATED ORAL
Qty: 0 | Refills: 0 | DISCHARGE

## 2019-06-01 RX ORDER — OMEPRAZOLE 10 MG/1
0 CAPSULE, DELAYED RELEASE ORAL
Qty: 0 | Refills: 0 | DISCHARGE

## 2019-06-01 RX ADMIN — Medication 1 TABLET(S): at 12:44

## 2019-06-01 RX ADMIN — Medication 600 MILLIGRAM(S): at 03:30

## 2019-06-01 RX ADMIN — Medication 600 MILLIGRAM(S): at 02:29

## 2019-06-01 RX ADMIN — Medication 975 MILLIGRAM(S): at 12:40

## 2019-06-01 RX ADMIN — Medication 100 MILLIGRAM(S): at 12:43

## 2019-06-01 RX ADMIN — Medication 600 MILLIGRAM(S): at 09:06

## 2019-06-01 RX ADMIN — Medication 975 MILLIGRAM(S): at 06:42

## 2019-06-01 RX ADMIN — Medication 975 MILLIGRAM(S): at 05:42

## 2019-06-01 RX ADMIN — Medication 975 MILLIGRAM(S): at 00:10

## 2019-06-01 RX ADMIN — Medication 325 MILLIGRAM(S): at 12:42

## 2019-06-01 RX ADMIN — Medication 975 MILLIGRAM(S): at 13:00

## 2019-06-01 RX ADMIN — Medication 975 MILLIGRAM(S): at 01:10

## 2019-06-01 RX ADMIN — HEPARIN SODIUM 5000 UNIT(S): 5000 INJECTION INTRAVENOUS; SUBCUTANEOUS at 00:12

## 2019-06-01 RX ADMIN — HEPARIN SODIUM 5000 UNIT(S): 5000 INJECTION INTRAVENOUS; SUBCUTANEOUS at 12:37

## 2019-06-01 RX ADMIN — Medication 1 MILLIGRAM(S): at 12:45

## 2019-06-01 NOTE — DISCHARGE NOTE OB - MATERIALS PROVIDED
Breastfeeding Log/[facility]  Screening Program/Tdap Vaccination (VIS Pub Date: 2012)/[facility] Hearing Screen Program/Breastfeeding Mother’s Support Group Information/Guide to Postpartum Care/Vaccinations/Back To Sleep Handout/Shaken Baby Prevention Handout/Birth Certificate Instructions

## 2019-06-01 NOTE — DISCHARGE NOTE OB - CARE PLAN
Principal Discharge DX:	Postpartum state  Goal:	discharge home  Assessment and plan of treatment:	 section, meeting all postpartum milestones.  Follow up in 2 weeks. Patient to notify provider for fever >101, heavy vaginal bleeding, extreme abdominal pain. Nothing per vagina, no tampons, tub baths, intercourse

## 2019-06-01 NOTE — DISCHARGE NOTE OB - PLAN OF CARE
discharge home  section, meeting all postpartum milestones.  Follow up in 2 weeks. Patient to notify provider for fever >101, heavy vaginal bleeding, extreme abdominal pain. Nothing per vagina, no tampons, tub baths, intercourse

## 2019-06-01 NOTE — PROGRESS NOTE ADULT - SUBJECTIVE AND OBJECTIVE BOX
Patient evaluated at bedside.   She reports pain is well controlled.  She denies headache, dizziness, chest pain, palpitations, shortness of breathe, nausea, vomiting or heavy vaginal bleeding.  She has been ambulating without assistance, voiding spontaneously, passing gas, tolerating regular diet and is breastfeeding. Patient feels well and ready to go home.     Physical Exam:  Vital Signs Last 24 Hrs  T(C): 36.4 (01 Jun 2019 05:45), Max: 36.9 (31 May 2019 22:00)  T(F): 97.5 (01 Jun 2019 05:45), Max: 98.4 (31 May 2019 22:00)  HR: 62 (01 Jun 2019 05:45) (48 - 62)  BP: 124/84 (01 Jun 2019 05:45) (118/70 - 148/82)  BP(mean): --  RR: 16 (01 Jun 2019 05:45) (16 - 18)  SpO2: 98% (01 Jun 2019 05:45) (96% - 100%)      GA: NAD, resting comfortably   Abd: + BS, soft, nontender, nondistended, no rebound or guarding, uterus firm at midline,  fb below umbilicus  Incision: well approximated, no erythema or discharge  : lochia WNL  Extremities: symmetric LE edema, no calf tenderness

## 2019-06-01 NOTE — DISCHARGE NOTE OB - PATIENT PORTAL LINK FT
You can access the [facility] Patient Portal, offered by [facility], by registering with the following website: [email or web address]

## 2019-06-01 NOTE — PROGRESS NOTE ADULT - SUBJECTIVE AND OBJECTIVE BOX
Patient evaluated at bedside.   She reports pain is well controlled with po pain meds  She denies headache, dizziness, chest pain, palpitations, shortness of breathe, nausea, vomiting or heavy vaginal bleeding.  She has been ambulating without assistance, voiding spontaneously, passing gas, tolerating regular diet and is breastfeeding.    Physical Exam:  Vital Signs Last 24 Hrs  T(C): 36.4 (01 Jun 2019 05:45), Max: 36.9 (31 May 2019 22:00)  T(F): 97.5 (01 Jun 2019 05:45), Max: 98.4 (31 May 2019 22:00)  HR: 62 (01 Jun 2019 05:45) (48 - 62)  BP: 124/84 (01 Jun 2019 05:45) (118/70 - 148/82)  BP(mean): --  RR: 16 (01 Jun 2019 05:45) (16 - 18)  SpO2: 98% (01 Jun 2019 05:45) (96% - 100%)    GA: NAD, A+0 x 3  Abd: + BS, soft, nontender, nondistended, no rebound or guarding, incision clean, dry and intact, uterus firm at midline,2  fb below umbilicus  : lochia WNL  Extremities: no swelling or calf tenderness, reflexes +2 bilaterally

## 2019-06-01 NOTE — DISCHARGE NOTE OB - CARE PROVIDER_API CALL
Nighat Ortiz (DO; MS)  Obstetrics and Gynecology  [address] [place] [address]  Phone: [phone]  Fax: [phone]  Follow Up Time:

## 2019-06-05 DIAGNOSIS — A60.1 HERPESVIRAL INFECTION OF PERIANAL SKIN AND RECTUM: ICD-10-CM

## 2019-06-05 DIAGNOSIS — Z34.03 ENCOUNTER FOR SUPERVISION OF NORMAL FIRST PREGNANCY, THIRD TRIMESTER: ICD-10-CM

## 2019-06-05 DIAGNOSIS — Z3A.41 41 WEEKS GESTATION OF PREGNANCY: ICD-10-CM

## 2019-06-05 LAB — SURGICAL PATHOLOGY STUDY: SIGNIFICANT CHANGE UP

## 2019-11-05 ENCOUNTER — TRANSCRIPTION ENCOUNTER (OUTPATIENT)
Age: 34
End: 2019-11-05

## 2020-01-09 NOTE — ASU PATIENT PROFILE, ADULT - ABILITY TO HEAR (WITH HEARING AID OR HEARING APPLIANCE IF NORMALLY USED):
Tried to inform patient about needing to reschedule appointment, but the phone number given is no longer in service.
Adequate: hears normal conversation without difficulty

## 2020-06-13 NOTE — ASU PATIENT PROFILE, ADULT - NSALCOHOLTYPE_GEN__A_CORE_SD
Pt lacking activity orders at this time. PT will follow up as appropriately pending OOB orders.
wine

## 2021-06-10 ENCOUNTER — TRANSCRIPTION ENCOUNTER (OUTPATIENT)
Age: 36
End: 2021-06-10

## 2021-06-11 ENCOUNTER — RESULT REVIEW (OUTPATIENT)
Age: 36
End: 2021-06-11

## 2021-06-11 ENCOUNTER — INPATIENT (INPATIENT)
Facility: HOSPITAL | Age: 36
LOS: 0 days | Discharge: HOME | DRG: 742 | End: 2021-06-12
Attending: OBSTETRICS & GYNECOLOGY | Admitting: OBSTETRICS & GYNECOLOGY
Payer: COMMERCIAL

## 2021-06-11 VITALS
HEIGHT: 67 IN | TEMPERATURE: 98 F | RESPIRATION RATE: 18 BRPM | SYSTOLIC BLOOD PRESSURE: 115 MMHG | WEIGHT: 150.58 LBS | HEART RATE: 58 BPM | DIASTOLIC BLOOD PRESSURE: 77 MMHG | OXYGEN SATURATION: 99 %

## 2021-06-11 DIAGNOSIS — Z41.9 ENCOUNTER FOR PROCEDURE FOR PURPOSES OTHER THAN REMEDYING HEALTH STATE, UNSPECIFIED: Chronic | ICD-10-CM

## 2021-06-11 DIAGNOSIS — Z98.891 HISTORY OF UTERINE SCAR FROM PREVIOUS SURGERY: Chronic | ICD-10-CM

## 2021-06-11 LAB
BLD GP AB SCN SERPL QL: NEGATIVE — SIGNIFICANT CHANGE UP
RH IG SCN BLD-IMP: POSITIVE — SIGNIFICANT CHANGE UP

## 2021-06-11 PROCEDURE — 88305 TISSUE EXAM BY PATHOLOGIST: CPT | Mod: 26

## 2021-06-11 PROCEDURE — 88302 TISSUE EXAM BY PATHOLOGIST: CPT | Mod: 26

## 2021-06-11 RX ORDER — HYDROMORPHONE HYDROCHLORIDE 2 MG/ML
0.5 INJECTION INTRAMUSCULAR; INTRAVENOUS; SUBCUTANEOUS
Refills: 0 | Status: DISCONTINUED | OUTPATIENT
Start: 2021-06-11 | End: 2021-06-12

## 2021-06-11 RX ORDER — GABAPENTIN 400 MG/1
600 CAPSULE ORAL ONCE
Refills: 0 | Status: COMPLETED | OUTPATIENT
Start: 2021-06-11 | End: 2021-06-11

## 2021-06-11 RX ORDER — LEVONORGESTREL 1.5 MG/1
52 TABLET ORAL ONCE
Refills: 0 | Status: DISCONTINUED | OUTPATIENT
Start: 2021-06-11 | End: 2021-06-11

## 2021-06-11 RX ORDER — SENNA PLUS 8.6 MG/1
2 TABLET ORAL AT BEDTIME
Refills: 0 | Status: DISCONTINUED | OUTPATIENT
Start: 2021-06-11 | End: 2021-06-12

## 2021-06-11 RX ORDER — ACETAMINOPHEN 500 MG
975 TABLET ORAL EVERY 6 HOURS
Refills: 0 | Status: DISCONTINUED | OUTPATIENT
Start: 2021-06-11 | End: 2021-06-12

## 2021-06-11 RX ORDER — SIMETHICONE 80 MG/1
80 TABLET, CHEWABLE ORAL EVERY 8 HOURS
Refills: 0 | Status: DISCONTINUED | OUTPATIENT
Start: 2021-06-11 | End: 2021-06-12

## 2021-06-11 RX ORDER — CELECOXIB 200 MG/1
400 CAPSULE ORAL ONCE
Refills: 0 | Status: COMPLETED | OUTPATIENT
Start: 2021-06-11 | End: 2021-06-11

## 2021-06-11 RX ORDER — HYDROMORPHONE HYDROCHLORIDE 2 MG/ML
0.5 INJECTION INTRAMUSCULAR; INTRAVENOUS; SUBCUTANEOUS EVERY 4 HOURS
Refills: 0 | Status: DISCONTINUED | OUTPATIENT
Start: 2021-06-11 | End: 2021-06-12

## 2021-06-11 RX ORDER — ONDANSETRON 8 MG/1
8 TABLET, FILM COATED ORAL EVERY 6 HOURS
Refills: 0 | Status: DISCONTINUED | OUTPATIENT
Start: 2021-06-11 | End: 2021-06-12

## 2021-06-11 RX ORDER — ACETAMINOPHEN 500 MG
1000 TABLET ORAL ONCE
Refills: 0 | Status: COMPLETED | OUTPATIENT
Start: 2021-06-11 | End: 2021-06-11

## 2021-06-11 RX ORDER — KETOROLAC TROMETHAMINE 30 MG/ML
30 SYRINGE (ML) INJECTION EVERY 6 HOURS
Refills: 0 | Status: DISCONTINUED | OUTPATIENT
Start: 2021-06-11 | End: 2021-06-12

## 2021-06-11 RX ORDER — SODIUM CHLORIDE 9 MG/ML
1000 INJECTION, SOLUTION INTRAVENOUS
Refills: 0 | Status: DISCONTINUED | OUTPATIENT
Start: 2021-06-11 | End: 2021-06-12

## 2021-06-11 RX ORDER — HEPARIN SODIUM 5000 [USP'U]/ML
5000 INJECTION INTRAVENOUS; SUBCUTANEOUS ONCE
Refills: 0 | Status: COMPLETED | OUTPATIENT
Start: 2021-06-11 | End: 2021-06-11

## 2021-06-11 RX ORDER — METOCLOPRAMIDE HCL 10 MG
10 TABLET ORAL EVERY 6 HOURS
Refills: 0 | Status: DISCONTINUED | OUTPATIENT
Start: 2021-06-11 | End: 2021-06-12

## 2021-06-11 RX ORDER — PANTOPRAZOLE SODIUM 20 MG/1
40 TABLET, DELAYED RELEASE ORAL
Refills: 0 | Status: DISCONTINUED | OUTPATIENT
Start: 2021-06-11 | End: 2021-06-12

## 2021-06-11 RX ADMIN — Medication 1000 MILLIGRAM(S): at 09:33

## 2021-06-11 RX ADMIN — Medication 975 MILLIGRAM(S): at 21:30

## 2021-06-11 RX ADMIN — HYDROMORPHONE HYDROCHLORIDE 0.5 MILLIGRAM(S): 2 INJECTION INTRAMUSCULAR; INTRAVENOUS; SUBCUTANEOUS at 12:11

## 2021-06-11 RX ADMIN — Medication 30 MILLIGRAM(S): at 17:27

## 2021-06-11 RX ADMIN — SENNA PLUS 2 TABLET(S): 8.6 TABLET ORAL at 21:04

## 2021-06-11 RX ADMIN — SIMETHICONE 80 MILLIGRAM(S): 80 TABLET, CHEWABLE ORAL at 13:29

## 2021-06-11 RX ADMIN — Medication 30 MILLIGRAM(S): at 17:07

## 2021-06-11 RX ADMIN — SODIUM CHLORIDE 125 MILLILITER(S): 9 INJECTION, SOLUTION INTRAVENOUS at 18:42

## 2021-06-11 RX ADMIN — HYDROMORPHONE HYDROCHLORIDE 0.5 MILLIGRAM(S): 2 INJECTION INTRAMUSCULAR; INTRAVENOUS; SUBCUTANEOUS at 12:55

## 2021-06-11 RX ADMIN — HEPARIN SODIUM 5000 UNIT(S): 5000 INJECTION INTRAVENOUS; SUBCUTANEOUS at 09:32

## 2021-06-11 RX ADMIN — Medication 975 MILLIGRAM(S): at 21:04

## 2021-06-11 RX ADMIN — SIMETHICONE 80 MILLIGRAM(S): 80 TABLET, CHEWABLE ORAL at 21:04

## 2021-06-11 RX ADMIN — CELECOXIB 400 MILLIGRAM(S): 200 CAPSULE ORAL at 09:33

## 2021-06-11 RX ADMIN — GABAPENTIN 600 MILLIGRAM(S): 400 CAPSULE ORAL at 09:32

## 2021-06-11 NOTE — H&P ADULT - HISTORY OF PRESENT ILLNESS
35yo here for ls endo excisoin.  She has painful menstrual cycles, clots, no mirena, constant dull pain in pelvis especially if full bladder.  Nausea/bloating/diarrhea/vomiting.  L stitch feeling in hip/front of thigh with back pain.  IUD in place wants to keep.    OB/GYN Hx: c/s 5/2019, HSV2  PMHx: denies  SHx: c/s  Meds: valtrex  Allergies: nkda    PHYSICAL EXAM:   Vital Signs Last 24 Hrs  T(C): 36.6 (11 Jun 2021 09:24), Max: 36.6 (11 Jun 2021 09:24)  T(F): 97.8 (11 Jun 2021 09:24), Max: 97.8 (11 Jun 2021 09:24)  HR: 58 (11 Jun 2021 09:24) (58 - 58)  BP: 115/77 (11 Jun 2021 09:24) (115/77 - 115/77)  BP(mean): --  RR: 18 (11 Jun 2021 09:24) (18 - 18)  SpO2: 99% (11 Jun 2021 09:24) (99% - 99%)    **************************  Constitutional: Alert & Oriented x3, No acute distress  Extremities: no calf tenderness or swelling

## 2021-06-11 NOTE — BRIEF OPERATIVE NOTE - NSICDXBRIEFPROCEDURE_GEN_ALL_CORE_FT
PROCEDURES:  Removal, endometriosis, laparoscopic 11-Jun-2021 11:49:34  Corinne Davidson  Appendectomy, laparoscopic 11-Jun-2021 11:49:43  Corinne Davidson  Dilation and curettage, uterus, with IUD removal 11-Jun-2021 11:50:06  Corinne Davidson

## 2021-06-11 NOTE — BRIEF OPERATIVE NOTE - OPERATION/FINDINGS
Normal appearing cervix, removed IUD, dilation and curettage - endometrial sampling sent to pathology  L/S excision of endometriosis, herniation of omentum taken down, lysis of adhesions, fibroisis and endometriosis sent to pathology  Inflamed appendix removed and sent to pathology  L ovarian suspension, garrett

## 2021-06-11 NOTE — BRIEF OPERATIVE NOTE - NSICDXBRIEFPREOP_GEN_ALL_CORE_FT
PRE-OP DIAGNOSIS:  Endometriosis 11-Jun-2021 11:50:14  Corinne Davidson  Pelvic pain 11-Jun-2021 11:50:21  Corinne Davidson

## 2021-06-12 ENCOUNTER — TRANSCRIPTION ENCOUNTER (OUTPATIENT)
Age: 36
End: 2021-06-12

## 2021-06-12 VITALS
HEART RATE: 54 BPM | RESPIRATION RATE: 18 BRPM | TEMPERATURE: 98 F | OXYGEN SATURATION: 97 % | DIASTOLIC BLOOD PRESSURE: 65 MMHG | SYSTOLIC BLOOD PRESSURE: 108 MMHG

## 2021-06-12 LAB
BASOPHILS # BLD AUTO: 0.03 K/UL — SIGNIFICANT CHANGE UP (ref 0–0.2)
BASOPHILS NFR BLD AUTO: 0.2 % — SIGNIFICANT CHANGE UP (ref 0–2)
COVID-19 SPIKE DOMAIN AB INTERP: POSITIVE
COVID-19 SPIKE DOMAIN ANTIBODY RESULT: 69.9 U/ML — HIGH
EOSINOPHIL # BLD AUTO: 0 K/UL — SIGNIFICANT CHANGE UP (ref 0–0.5)
EOSINOPHIL NFR BLD AUTO: 0 % — SIGNIFICANT CHANGE UP (ref 0–6)
HCT VFR BLD CALC: 37.7 % — SIGNIFICANT CHANGE UP (ref 34.5–45)
HGB BLD-MCNC: 12.5 G/DL — SIGNIFICANT CHANGE UP (ref 11.5–15.5)
IMM GRANULOCYTES NFR BLD AUTO: 0.2 % — SIGNIFICANT CHANGE UP (ref 0–1.5)
LYMPHOCYTES # BLD AUTO: 1.41 K/UL — SIGNIFICANT CHANGE UP (ref 1–3.3)
LYMPHOCYTES # BLD AUTO: 10.2 % — LOW (ref 13–44)
MCHC RBC-ENTMCNC: 29.6 PG — SIGNIFICANT CHANGE UP (ref 27–34)
MCHC RBC-ENTMCNC: 33.2 GM/DL — SIGNIFICANT CHANGE UP (ref 32–36)
MCV RBC AUTO: 89.3 FL — SIGNIFICANT CHANGE UP (ref 80–100)
MONOCYTES # BLD AUTO: 0.71 K/UL — SIGNIFICANT CHANGE UP (ref 0–0.9)
MONOCYTES NFR BLD AUTO: 5.1 % — SIGNIFICANT CHANGE UP (ref 2–14)
NEUTROPHILS # BLD AUTO: 11.63 K/UL — HIGH (ref 1.8–7.4)
NEUTROPHILS NFR BLD AUTO: 84.3 % — HIGH (ref 43–77)
NRBC # BLD: 0 /100 WBCS — SIGNIFICANT CHANGE UP (ref 0–0)
PLATELET # BLD AUTO: 209 K/UL — SIGNIFICANT CHANGE UP (ref 150–400)
RBC # BLD: 4.22 M/UL — SIGNIFICANT CHANGE UP (ref 3.8–5.2)
RBC # FLD: 12.2 % — SIGNIFICANT CHANGE UP (ref 10.3–14.5)
SARS-COV-2 IGG+IGM SERPL QL IA: 69.9 U/ML — HIGH
SARS-COV-2 IGG+IGM SERPL QL IA: POSITIVE
WBC # BLD: 13.81 K/UL — HIGH (ref 3.8–10.5)
WBC # FLD AUTO: 13.81 K/UL — HIGH (ref 3.8–10.5)

## 2021-06-12 PROCEDURE — 86901 BLOOD TYPING SEROLOGIC RH(D): CPT

## 2021-06-12 PROCEDURE — 88302 TISSUE EXAM BY PATHOLOGIST: CPT

## 2021-06-12 PROCEDURE — 86850 RBC ANTIBODY SCREEN: CPT

## 2021-06-12 PROCEDURE — 86769 SARS-COV-2 COVID-19 ANTIBODY: CPT

## 2021-06-12 PROCEDURE — 36415 COLL VENOUS BLD VENIPUNCTURE: CPT

## 2021-06-12 PROCEDURE — 86900 BLOOD TYPING SEROLOGIC ABO: CPT

## 2021-06-12 PROCEDURE — 88305 TISSUE EXAM BY PATHOLOGIST: CPT

## 2021-06-12 PROCEDURE — 85025 COMPLETE CBC W/AUTO DIFF WBC: CPT

## 2021-06-12 RX ADMIN — Medication 30 MILLIGRAM(S): at 01:30

## 2021-06-12 RX ADMIN — Medication 30 MILLIGRAM(S): at 06:03

## 2021-06-12 RX ADMIN — Medication 30 MILLIGRAM(S): at 12:34

## 2021-06-12 RX ADMIN — SODIUM CHLORIDE 125 MILLILITER(S): 9 INJECTION, SOLUTION INTRAVENOUS at 04:14

## 2021-06-12 RX ADMIN — Medication 975 MILLIGRAM(S): at 04:14

## 2021-06-12 RX ADMIN — Medication 30 MILLIGRAM(S): at 12:04

## 2021-06-12 RX ADMIN — Medication 975 MILLIGRAM(S): at 09:17

## 2021-06-12 RX ADMIN — PANTOPRAZOLE SODIUM 40 MILLIGRAM(S): 20 TABLET, DELAYED RELEASE ORAL at 06:03

## 2021-06-12 RX ADMIN — Medication 975 MILLIGRAM(S): at 10:00

## 2021-06-12 RX ADMIN — Medication 975 MILLIGRAM(S): at 04:44

## 2021-06-12 RX ADMIN — Medication 30 MILLIGRAM(S): at 01:23

## 2021-06-12 RX ADMIN — SIMETHICONE 80 MILLIGRAM(S): 80 TABLET, CHEWABLE ORAL at 06:03

## 2021-06-12 NOTE — PROGRESS NOTE ADULT - ASSESSMENT
A: s/p ls endo excision, hysteroscopy, d&c    Plan:  1. Vital signs stable, continue to monitor per protocol  2. Pain control: tylenol/toradol, dilaudid  3. DVT prophylaxis: SCDs  4. CV: IVH   5. Pulm: Incentive spirometer (at least 10 times per hour while awake)   6. GI: ADAT  7. : Phan   8. Follow up labs: AM CBC  9. Activity: bedrest tonight 
    Assessment and Plan: 37yo POD1 s/p l/s zi excision, D&C, appendectomy, stable    1. ID: --  2. FEN/GI - Regular diet as tolerated   3. PAIN - Controlled with oral pain medications, s/p L suspension   4.  - s/p garrett, f/u TOV 13:00  5. RESP -  No acute issues.   6. VTE prophylaxis - SCDs, ambulate as tolerated.   7. LABS -  f/u AM labs  8. DISPO -

## 2021-06-12 NOTE — PROGRESS NOTE ADULT - SUBJECTIVE AND OBJECTIVE BOX
GYN PROGRESS NOTE    Patient evaluated at the bedside. No acute events.  Denies CP/SOB/dizziness/nausea/vomiting/abdominal pain/calf pain.  Pain well controlled on oral pain medications. Patient has not yet ambulated, passing flatus and tolerating a regular diet.    O:   T(C): 37.2 (06-11-21 @ 23:47), Max: 37.2 (06-11-21 @ 14:44)  HR: 75 (06-11-21 @ 23:47) (50 - 75)  BP: 106/66 (06-11-21 @ 23:47) (102/59 - 143/58)  RR: 18 (06-11-21 @ 23:47) (16 - 18)  SpO2: 97% (06-11-21 @ 23:47) (97% - 100%)  Wt(kg): --    GEN: patient appears well  LUNGS: no respiratory distress  ABD: soft, mildly distended, appropriately tender, incisions c/d/i, L ovarian suspension removed  Pelvic: no vaginal bleeding noted, garrett in place  EXT: no calf tenderness scds in place        06-11 @ 07:01  -  06-12 @ 05:33  --------------------------------------------------------  IN: 2865 mL / OUT: 2200 mL / NET: 665 mL                
GYN POC    Pt seen and examined at bedside. Pt complains of mild abdominal pain.   Pt denies any fever, chills, chest pain, SOB, nausea or vomiting     T(F): 98.9 (06-11-21 @ 14:44), Max: 98.9 (06-11-21 @ 14:44)  HR: 65 (06-11-21 @ 14:44) (50 - 65)  BP: 102/59 (06-11-21 @ 14:44) (102/59 - 143/58)  RR: 17 (06-11-21 @ 14:44) (16 - 18)  SpO2: 98% (06-11-21 @ 14:44) (98% - 100%)  Wt(kg): --    06-11 @ 07:01  -  06-11 @ 15:02  --------------------------------------------------------  IN: 375 mL / OUT: 100 mL / NET: 275 mL        acetaminophen   Tablet .. 975 milliGRAM(s) Oral every 6 hours  HYDROmorphone  Injectable 0.5 milliGRAM(s) IV Push every 15 minutes PRN Severe Pain (7 - 10)  HYDROmorphone  Injectable 0.5 milliGRAM(s) IV Push every 4 hours PRN Severe Pain (7 - 10)  ketorolac   Injectable 30 milliGRAM(s) IV Push every 6 hours  lactated ringers. 1000 milliLiter(s) IV Continuous <Continuous>  metoclopramide Injectable 10 milliGRAM(s) IV Push every 6 hours PRN Nausea and/or Vomiting  ondansetron Injectable 8 milliGRAM(s) IV Push every 6 hours PRN Nausea and/or Vomiting, 1st line  pantoprazole    Tablet 40 milliGRAM(s) Oral before breakfast  senna 2 Tablet(s) Oral at bedtime  simethicone 80 milliGRAM(s) Chew every 8 hours      Physical exam:  Constitutional: NAD  Pulmonary: clear to auscultation bilaterally  Cardiovascular: regular rate and rhythm  Abdomen: incision site clean, dry and intact. Soft, mildly tender, nondistended, incisions c/d/i, L ov susp in place  Extremities: no lower extremity edema, or calve tenderness. SCDs in place

## 2021-06-12 NOTE — DISCHARGE NOTE PROVIDER - HOSPITAL COURSE
37yo F was admitted for l/s endometriosis excision, hysteroscopy and D&C. Uncomplicated surgery and postoperative course except for pain control difficulties. Prior to discharge she met all postoperative milestones. Her labs and vital signs are stable and pain is well controlled.

## 2021-06-12 NOTE — DISCHARGE NOTE NURSING/CASE MANAGEMENT/SOCIAL WORK - PATIENT PORTAL LINK FT
You can access the FollowMyHealth Patient Portal offered by [facility] by registering at the following website: [email or web address]. By joining [employer]’s FollowMyHealth portal, you will also be able to view your health information using other applications (apps) compatible with our system.

## 2021-06-12 NOTE — DISCHARGE NOTE PROVIDER - NSDCFUADDINST_GEN_ALL_CORE_FT
- Nothing in vagina - no intercourse, tampons, or douching until cleared by your doctor.   - Avoid swimming, tub baths, and heavy lifting until cleared by your doctor.   - Showering is ok.   - Continue oral pain medications as needed for pain.    - Follow up in office in 1-2 weeks for your postoperative visit.    - Call the office sooner if you develop any fever, heavy bleeding, or severe pain.  Go to the closest emergency room for any of these symptoms if you are not able to contact your doctor.

## 2021-06-12 NOTE — DISCHARGE NOTE PROVIDER - CARE PROVIDER_API CALL
Medardo Dela Cruz  OBSTETRICS AND GYNECOLOGY  [address] [place] [address]  Phone: [phone]  Fax: [phone]  Follow Up Time:

## 2021-06-16 LAB — SURGICAL PATHOLOGY STUDY: SIGNIFICANT CHANGE UP

## 2021-06-21 DIAGNOSIS — Q51.810 ARCUATE UTERUS: ICD-10-CM

## 2021-06-21 DIAGNOSIS — N80.5 ENDOMETRIOSIS OF INTESTINE: ICD-10-CM

## 2021-06-21 DIAGNOSIS — N80.3 ENDOMETRIOSIS OF PELVIC PERITONEUM: ICD-10-CM

## 2021-06-21 DIAGNOSIS — Z91.040 LATEX ALLERGY STATUS: ICD-10-CM

## 2021-06-21 DIAGNOSIS — N80.9 ENDOMETRIOSIS, UNSPECIFIED: ICD-10-CM

## 2021-06-21 DIAGNOSIS — Z46.89 ENCOUNTER FOR FITTING AND ADJUSTMENT OF OTHER SPECIFIED DEVICES: ICD-10-CM

## 2021-06-21 DIAGNOSIS — K43.6 OTHER AND UNSPECIFIED VENTRAL HERNIA WITH OBSTRUCTION, WITHOUT GANGRENE: ICD-10-CM

## 2021-10-04 ENCOUNTER — NON-APPOINTMENT (OUTPATIENT)
Age: 36
End: 2021-10-04

## 2021-10-04 ENCOUNTER — APPOINTMENT (OUTPATIENT)
Dept: PHYSICAL MEDICINE AND REHAB | Facility: CLINIC | Age: 36
End: 2021-10-04
Payer: COMMERCIAL

## 2021-10-04 VITALS
SYSTOLIC BLOOD PRESSURE: 131 MMHG | RESPIRATION RATE: 18 BRPM | BODY MASS INDEX: 23.23 KG/M2 | HEIGHT: 67 IN | DIASTOLIC BLOOD PRESSURE: 88 MMHG | HEART RATE: 67 BPM | TEMPERATURE: 97.9 F | WEIGHT: 148 LBS

## 2021-10-04 DIAGNOSIS — Z87.891 PERSONAL HISTORY OF NICOTINE DEPENDENCE: ICD-10-CM

## 2021-10-04 DIAGNOSIS — Z78.9 OTHER SPECIFIED HEALTH STATUS: ICD-10-CM

## 2021-10-04 DIAGNOSIS — R20.0 ANESTHESIA OF SKIN: ICD-10-CM

## 2021-10-04 DIAGNOSIS — G57.20 LESION OF FEMORAL NERVE, UNSPECIFIED LOWER LIMB: ICD-10-CM

## 2021-10-04 DIAGNOSIS — G89.29 PAIN IN LEFT HIP: ICD-10-CM

## 2021-10-04 DIAGNOSIS — M70.62 TROCHANTERIC BURSITIS, LEFT HIP: ICD-10-CM

## 2021-10-04 DIAGNOSIS — M25.552 PAIN IN LEFT HIP: ICD-10-CM

## 2021-10-04 DIAGNOSIS — R29.898 OTHER SYMPTOMS AND SIGNS INVOLVING THE MUSCULOSKELETAL SYSTEM: ICD-10-CM

## 2021-10-04 PROCEDURE — 99244 OFF/OP CNSLTJ NEW/EST MOD 40: CPT

## 2021-10-04 RX ORDER — VALACYCLOVIR 500 MG/1
500 TABLET, FILM COATED ORAL
Qty: 30 | Refills: 0 | Status: ACTIVE | COMMUNITY
Start: 2021-07-23

## 2021-10-04 RX ORDER — LEVONORGESTREL 52 MG/1
20 INTRAUTERINE DEVICE INTRAUTERINE
Qty: 1 | Refills: 0 | Status: ACTIVE | COMMUNITY
Start: 2021-09-15

## 2021-10-04 RX ORDER — VALACYCLOVIR 500 MG/1
500 TABLET, FILM COATED ORAL
Refills: 0 | Status: ACTIVE | COMMUNITY

## 2021-10-04 RX ORDER — DICLOFENAC SODIUM TOPICAL GEL, 1% 10 MG/G
1 GEL TOPICAL DAILY
Qty: 1 | Refills: 0 | Status: ACTIVE | COMMUNITY
Start: 2021-10-04 | End: 1900-01-01

## 2021-10-04 NOTE — PHYSICAL EXAM
[FreeTextEntry1] : PHYSICAL EXAM : OBJECTIVE \par \par GENERAL : Awake ,alert and oriented to time place and person \par HEAD : normocephalic and atraumatic \par NECK : supple ,no tracheal deviation ,no thyroid enlargement noted with swallowing\par EYES : sclera and conjunctiva normal no redness,intact extraocular movements \par ENT  : ears and nose normal in appearance -hearing adequate \par PULMONARY: effort normal. No respiratory distress. breathing regular. No wheezes \par LYMPH : No swelling in limbs, capillary return within normal range \par CVS : warm extremities,no palpitations,not short of breath, no visible jugular venous distention\par PSYCH : mood and affect normal ,good eye contact ,normal attention \par ABDOMEN : no visible distension , \par NEUROLOGICAL:cranial nerves intact,muscle tone normal,gait and balance safe except where noted below \par SKIN : warm and dry No rash detected over specific body areas examined \par MUSCULOSKELETAL: normal muscle bulk, no focal bony tenderness /posture normal except where specified below\par tender over left greater trochanter ++\par tight ITB \par weak left quads 4/5 since Gyn  sx 4 months ago \par numb left central thigh corresponding to Femoral nerve\par spine ROM full and painfree\par hip ROM full and painfree \par some pain left traps with trigger points \par No long tract signs found on clinical exam and no focal neurological deficits\par gait NL

## 2021-10-04 NOTE — REVIEW OF SYSTEMS
[Patient Intake Form Reviewed] : Patient intake form was reviewed [Muscle Weakness] : muscle weakness [Negative] : Heme/Lymph [Fever] : no fever [Fatigue] : no fatigue [Recent Change In Weight] : ~T no recent weight change [Lower Ext Edema] : no lower extremity edema [Joint Pain] : no joint pain [Joint Stiffness] : no joint stiffness [Muscle Pain] : no muscle pain [Difficulty Walking] : no difficulty walking [FreeTextEntry8] : had endometriosis Sx in June 2021  has residual numbness ant thigh and some weakness consistent with mild  injury to left femoral nerve

## 2021-10-04 NOTE — CONSULT LETTER
[FreeTextEntry1] : Dear Dr. ELIAS   , \par \par I had the pleasure of evaluating your patient, AARON ARREOLA .\par \par Thank you very much for allowing me to participate in the care of this patient. If you have any questions, please do not hesitate to contact me. \par \par Sincerely, \par Tammy Tilley MD \par \par ABPMR Board Certified in Physical Medicine and Rehabilitation\par Certified Fellow of AANEM (Neuromuscular and Electrodiagnostic Medicine)\par Subspecialty certified in Sports Medicine (ABPMR)\par \par  of Physical Medicine and Rehabilitation\par [facility] [employer] [facility] [place]\par [facility]\par \par

## 2021-10-04 NOTE — ASSESSMENT
[FreeTextEntry1] : \par PLAN AND RECOMMENDATIONS :\par \par We discussed differential diagnosis and clinical impression\par she has 2 issues here \par bursitis left hip area plus femoral nerve injury -neuropraxia or traction injury can occur  due to recent gyn sx \par PT will help both \par \par Recommend\par .symptomatic care and support\par  medications voltaren gel apply to GT-( twice a day), -warned of  possible GI side effects -advised to take with meals or add over the counter Nexium, if sensitive\par \par  imaging not needed \par  referral to PT modalities left GT plus ITB stretching -work on  left quad s PRE too \par  hydrotherapy /heat / cold for pain\par  continue  precautions including care with bending, lifting, twisting and  carrying.\par \par  relative rest and avoidance of painful activity where possible \par  increasing activity as discussed \par  return for follow up 6 weeks \par explained that if not significantly improved cortisone injection into bursa advised \par spoke about multi B complex for nerve recovery \par The patient was given handouts to read on this condition,helpful hints ,exercises etc \par all questions answered\par I certify that > 50 % of time spent was spent on counselling and coordination of care and more than 50% face to face directly \par Time spent including review of documents /records/decision making /discussion  amounted  > 60  minutes \par \par Total clinician time on the date of this encounter included \par \par 1 preparing to see the patient and review of prior notes, tests and other records \par 2 obtaining and reviewing and/ or confirming the history\par 3 performing a medically necessary, pertinent  and appropriate examination \par 4 ordering medications and supplements explaining side effects to look for ,tests,procedures,therapy and or specialty referrals\par 5 explaining the diagnosis or differential to the patient \par 6 communicating with other physicians or providers before during or after the visit.\par \par \par

## 2021-10-04 NOTE — HISTORY OF PRESENT ILLNESS
[FreeTextEntry1] : Ms. AARON ARREOLA is a very pleasant 36 year female who seen for evaluation of left hip outer thigh pain  that has been ongoing for 8 months -since feb 2021 without any specific injury or inciting event. The pain is located primarily trochanteric area  intermittent in nature and described as achy burning  . The pain is rated as 4/10 during today's visit, and ranges from 2-7/10. The patient's symptoms are aggravated by pressure or getting up after sitting to stand   and alleviated by rest . The patient denies any night pain, numbness/tingling on other side , weakness R leg or uppers , or bowel/bladder dysfunction. The patient has no other complaints at this time.\par she is a masters student international  relations \par this started when she was sedentary sitting a lot then started doing some aussie videos with HIT training \par she has a [employer] private  \par since sx for endometriosis  June 2021 she has left thigh numbness size of orange ant thigh and some leg weakness instability -mild \par she tried B12 liquid but made her sick the cranberry component \par she has many food sensitivities including tumeric and figs and beans \par

## 2022-11-14 NOTE — ED ADULT NURSE NOTE - HARM RISK FACTORS
Avoid tight fitting shoes. Try bunion pads. Follow-up with podiatry if needed. Return for worsening or concerning symptoms. no

## 2023-01-19 ENCOUNTER — APPOINTMENT (OUTPATIENT)
Dept: PHYSICAL MEDICINE AND REHAB | Facility: CLINIC | Age: 38
End: 2023-01-19
Payer: COMMERCIAL

## 2023-01-19 VITALS
RESPIRATION RATE: 18 BRPM | HEIGHT: 67 IN | HEART RATE: 53 BPM | BODY MASS INDEX: 23.54 KG/M2 | DIASTOLIC BLOOD PRESSURE: 62 MMHG | WEIGHT: 150 LBS | SYSTOLIC BLOOD PRESSURE: 91 MMHG

## 2023-01-19 PROCEDURE — 99215 OFFICE O/P EST HI 40 MIN: CPT

## 2023-01-19 RX ORDER — DIAZEPAM 5 MG/1
5 TABLET ORAL
Qty: 30 | Refills: 0 | Status: ACTIVE | COMMUNITY
Start: 2023-01-19 | End: 1900-01-01

## 2023-01-19 RX ORDER — METHYLPREDNISOLONE 4 MG/1
4 TABLET ORAL
Qty: 1 | Refills: 1 | Status: ACTIVE | COMMUNITY
Start: 2023-01-19 | End: 1900-01-01

## 2023-01-19 RX ORDER — GABAPENTIN 100 MG/1
100 CAPSULE ORAL
Qty: 30 | Refills: 3 | Status: ACTIVE | COMMUNITY
Start: 2023-01-19 | End: 1900-01-01

## 2023-01-19 RX ORDER — MENTHOL, METHYL SALICYLATE 63; 210 MG/1; MG/1
PATCH PERCUTANEOUS; TOPICAL; TRANSDERMAL AS DIRECTED
Qty: 30 | Refills: 0 | Status: ACTIVE | COMMUNITY
Start: 2023-01-19 | End: 1900-01-01

## 2023-01-19 NOTE — HISTORY OF PRESENT ILLNESS
[FreeTextEntry1] : Ms. AARON ARREOLA is a very pleasant 37 year female who comes in for evaluation of neck pain radiating to the R  upper extremity that has been ongoing for 2 days   without any specific injury or inciting event. \par she woke up with it no trauma no sprains \par The pain is located primarily post neck intermittent in nature and described as burning stabbing sharp  . The pain is rated as 9/10 during today's visit, and ranges from 8-10/10. The patient's symptoms are aggravated by turning head any way   and alleviated by heat \par she has been taking cyclobenzaprine and naproxen her PCP gave her percocet today \par she was in ER but sent home after Toradol IM  . The patient denies any numbness/tingling, weakness, or bowel/bladder dysfunction. The patient has no other complaints at this time.\par the pain does shoot down to R elbow at times \par she can walk fine\par no paresis uE or LE \par no numbness \par

## 2023-01-19 NOTE — ASSESSMENT
[FreeTextEntry1] : \par PLAN AND RECOMMENDATIONS :\par \par We discussed differential diagnosis and clinical impression\par reassured \par \par acute torticollis post viral or slept wrong muscle spasm \par \par needs a little time \par \par Recommend\par .symptomatic care and support -soft cervical collar use during day can sleep in it too \par  medications add medrol dose mya- gabapentin at night 100 mg Valium 5 mg at night and salon pas patches 12 hours on and 12 off \par will get xrays c spine \par no indication for MR melvin 2 days of pain \par \par  imaging xrays \par  referral to pT once less pain \par  hydrotherapy /heat / cold for pain ice and heat contrast good \par  continue  spinal precautions including care with bending, lifting, twisting and  carrying.\par \par  relative rest and avoidance of painful activity where possible \par  increasing activity as discussed \par  return for follow up i week\par \par time spent > 40 mins as poly pharmacy needed/ involved to achieve pain control possible   side effects noted \par warned not to take nsaids with steroids\par can take tylenol however \par \par she is quite anxious about it \par called her MD to also appraise him of plan and Rx -on his cell phone \par letter sent as well \par

## 2023-01-19 NOTE — PHYSICAL EXAM
[FreeTextEntry1] : PHYSICAL EXAM : OBJECTIVE \par \par GENERAL : Awake ,alert and oriented to time place and person \par HEAD : normocephalic and atraumatic \par NECK : supple ,no tracheal deviation ,no thyroid enlargement noted with swallowing\par EYES : sclera and conjunctiva normal no redness,intact extraocular movements \par ENT  : ears and nose normal in appearance -hearing adequate \par PULMONARY: effort normal. No respiratory distress. breathing regular. No wheezes \par LYMPH : No swelling in limbs, capillary return within normal range \par CVS : warm extremities,no palpitations,not short of breath, no visible jugular venous distention\par PSYCH : mood and affect normal ,good eye contact ,normal attention \par ABDOMEN : no visible distension , \par NEUROLOGICAL:cranial nerves intact,muscle tone normal,gait and balance safe except where noted below \par SKIN : warm and dry No rash detected over specific body areas examined \par MUSCULOSKELETAL: normal muscle bulk, no focal bony tenderness /posture normal except where specified below\par C spine ROm very limited all directions \par spasm R traps \par ++ tender ++ \par No long tract signs found on clinical exam and no focal neurological deficits\par reflexes 2 + UE LE\par sensation Nl \par gait good \par neuro exam NL

## 2023-01-19 NOTE — CONSULT LETTER
[FreeTextEntry1] : Dear Dr. EFRAIN ELIAS  , \par \par I had the pleasure of evaluating your patient, AARON ARREOLA .\par \par Thank you very much for allowing me to participate in the care of this patient. If you have any questions, please do not hesitate to contact me. \par \par Sincerely, \par Tammy Tilley MD \par \par ABPMR Board Certified in Physical Medicine and Rehabilitation\par Certified Fellow of AANEM (Neuromuscular and Electrodiagnostic Medicine)\par Subspecialty certified in Sports Medicine (ABPMR)\par \par  of Physical Medicine and Rehabilitation\par [facility]\par [facility]\par \par

## 2023-01-19 NOTE — REASON FOR VISIT
[Follow-Up] : a follow-up visit [FreeTextEntry1] : new problem -went to ER -severe neck spasm 2 days

## 2023-01-19 NOTE — REVIEW OF SYSTEMS
[Patient Intake Form Reviewed] : Patient intake form was reviewed [Fever] : no fever [Lower Ext Edema] : no lower extremity edema [Joint Pain] : joint pain [Joint Stiffness] : joint stiffness [Muscle Pain] : muscle pain [Muscle Weakness] : no muscle weakness [Negative] : Heme/Lymph

## 2023-04-03 ENCOUNTER — APPOINTMENT (OUTPATIENT)
Dept: PHYSICAL MEDICINE AND REHAB | Facility: CLINIC | Age: 38
End: 2023-04-03
Payer: COMMERCIAL

## 2023-04-03 VITALS — RESPIRATION RATE: 18 BRPM | HEIGHT: 67 IN | BODY MASS INDEX: 23.54 KG/M2 | WEIGHT: 150 LBS

## 2023-04-03 DIAGNOSIS — M43.6 TORTICOLLIS: ICD-10-CM

## 2023-04-03 DIAGNOSIS — M62.838 OTHER MUSCLE SPASM: ICD-10-CM

## 2023-04-03 PROCEDURE — 99214 OFFICE O/P EST MOD 30 MIN: CPT

## 2023-04-03 NOTE — CONSULT LETTER
[FreeTextEntry1] : Dear Dr. EFRAIN ELIAS  , \par \par I had the pleasure of evaluating your patient, AARON ARREOLA .\par \par Thank you very much for allowing me to participate in the care of this patient. If you have any questions, please do not hesitate to contact me. \par \par Sincerely, \par Tammy Tilley MD \par \par ABPMR Board Certified in Physical Medicine and Rehabilitation\par Certified Fellow of AANEM (Neuromuscular and Electrodiagnostic Medicine)\par Subspecialty certified in Sports Medicine (ABPMR)\par \par  of Physical Medicine and Rehabilitation\par [facility]\par [facility]\par \par

## 2023-04-03 NOTE — PHYSICAL EXAM
[FreeTextEntry1] : PHYSICAL EXAM : OBJECTIVE \par \par GENERAL : Awake ,alert and oriented to time place and person \par HEAD : normocephalic and atraumatic \par NECK : supple ,no tracheal deviation ,no thyroid enlargement noted with swallowing\par EYES : sclera and conjunctiva normal no redness,intact extraocular movements \par ENT  : ears and nose normal in appearance -hearing adequate \par PULMONARY: effort normal. No respiratory distress. breathing regular. No wheezes \par LYMPH : No swelling in limbs, capillary return within normal range \par CVS : warm extremities,no palpitations,not short of breath, no visible jugular venous distention\par PSYCH : mood and affect normal ,good eye contact ,normal attention \par ABDOMEN : no visible distension , \par NEUROLOGICAL:cranial nerves intact,muscle tone normal,gait and balance safe except where noted below \par SKIN : warm and dry No rash detected over specific body areas examined \par MUSCULOSKELETAL: normal muscle bulk, no focal bony tenderness /posture normal except where specified below\par tender R traps \par ROM better AROM \par trigger point L neck \par spine ROM full and painfree\par \par some pain left traps with trigger points \par No long tract signs found on clinical exam and no focal neurological deficits\par gait NL

## 2023-04-03 NOTE — REVIEW OF SYSTEMS
[Muscle Pain] : muscle pain [Negative] : Heme/Lymph [Shortness Of Breath] : no shortness of breath [Muscle Weakness] : no muscle weakness [Difficulty Walking] : no difficulty walking

## 2023-04-03 NOTE — HISTORY OF PRESENT ILLNESS
[FreeTextEntry1] : Ms. AARON ARREOLA is a very pleasant 37 year female being treated for  neck pain radiating to the R  upper extremity   without any specific injury or inciting event. \par she woke up with it no trauma no sprains \par The pain is located primarily post neck intermittent in nature and described as burning stabbing sharp  . The pain is rated as 0/10 ( was 9) when she presented  and ranges from 0-3/10 now . The patient's symptoms are aggravated by turning head any way suddenly   and alleviated by heat \par she is doing her HEP and is very diligent \par she had been taking cyclobenzaprine and naproxen her PCP even percocet before she started PT \par \par meds prescribed after exam - also help gabapentin and salon pas \par she is off valium now \par \par she even had visit to  ER prior to seeing PT but sent home after Toradol IM  . The patient denies any numbness/tingling, weakness, or bowel/bladder dysfunction. The patient has no other complaints at this time.\par the pain does shoot down to R elbow at times \par she can walk fine\par no paresis uE or LE \par no numbness \par

## 2023-04-03 NOTE — ASSESSMENT
[FreeTextEntry1] : \par PLAN AND RECOMMENDATIONS :\par \par We discussed  clinical impression\par feels 90 % improved after 10 sessions of PT \par very pleased but nervous about relapse \par advise cont PT once a week 4-6 weeks \par \par Recommend\par .symptomatic care and support\par  medications cont gabapentin working no s/e \par  imaging npt needed \par  referral to PT \par  hydrotherapy /heat / cold for pain\par  continue  ergonomic precautions including pacing ,posture and frequent breaks while typing.\par \par  relative rest and avoidance of painful activity where possible \par  increasing activity as discussed \par  return for follow up 6-8 weeks or prn \par

## 2023-05-17 ENCOUNTER — APPOINTMENT (OUTPATIENT)
Dept: PHYSICAL MEDICINE AND REHAB | Facility: CLINIC | Age: 38
End: 2023-05-17
Payer: COMMERCIAL

## 2023-05-17 VITALS
HEIGHT: 67 IN | RESPIRATION RATE: 18 BRPM | HEART RATE: 61 BPM | DIASTOLIC BLOOD PRESSURE: 64 MMHG | BODY MASS INDEX: 24.33 KG/M2 | SYSTOLIC BLOOD PRESSURE: 118 MMHG | WEIGHT: 155 LBS

## 2023-05-17 DIAGNOSIS — M79.10 MYALGIA, UNSPECIFIED SITE: ICD-10-CM

## 2023-05-17 DIAGNOSIS — M76.02 GLUTEAL TENDINITIS, LEFT HIP: ICD-10-CM

## 2023-05-17 DIAGNOSIS — M79.672 PAIN IN LEFT FOOT: ICD-10-CM

## 2023-05-17 DIAGNOSIS — R52 PAIN, UNSPECIFIED: ICD-10-CM

## 2023-05-17 PROCEDURE — 99214 OFFICE O/P EST MOD 30 MIN: CPT

## 2023-05-17 RX ORDER — DICLOFENAC SODIUM 1% 10 MG/G
1 GEL TOPICAL DAILY
Qty: 1 | Refills: 2 | Status: ACTIVE | COMMUNITY
Start: 2023-05-17 | End: 1900-01-01

## 2023-05-17 NOTE — PHYSICAL EXAM
[FreeTextEntry1] : PHYSICAL EXAM : OBJECTIVE \par \par GENERAL : Awake ,alert and oriented to time place and person \par HEAD : normocephalic and atraumatic \par NECK : supple ,no tracheal deviation ,no thyroid enlargement noted with swallowing\par EYES : sclera and conjunctiva normal no redness,intact extraocular movements \par ENT  : ears and nose normal in appearance -hearing adequate \par PULMONARY: effort normal. No respiratory distress. breathing regular. No wheezes \par LYMPH : No swelling in limbs, capillary return within normal range \par CVS : warm extremities,no palpitations,not short of breath, no visible jugular venous distention\par PSYCH : mood and affect normal ,good eye contact ,normal attention \par ABDOMEN : no visible distension , \par NEUROLOGICAL:cranial nerves intact,muscle tone normal,gait and balance safe except where noted below \par SKIN : warm and dry No rash detected over specific body areas examined \par MUSCULOSKELETAL: normal muscle bulk, no focal bony tenderness /posture normal except where specified below\par tender over left prox gluts +++trigger point \par ROM hips full SLR neg \par gait NL\par No long tract signs found on clinical exam and no focal neurological deficits\par

## 2023-05-17 NOTE — ASSESSMENT
[FreeTextEntry1] : \par PLAN AND RECOMMENDATIONS :\par \par We discussed differential diagnosis and clinical impression\par advise Voltaren cream and PT \par she prefers Thelma given script prof PT \par Recommend\par .symptomatic care and support\par  medications NSAIDS topical -personal preference )-(once or twice a day), -warned of  possible GI side effects -advised to take with meals or add over the counter Nexium, if sensitive\par \par  imaging not needed \par  referral to PT \par  hydrotherapy /heat / cold for pain\par  continue  spinal precautions including care with bending, lifting, twisting and  carrying.\par \par  relative rest and avoidance of painful activity where possible \par  increasing activity as discussed \par  return for follow up 6-8 weeks \par The patient had the opportunity to ask questions and all were answered to their satisfaction. We will coordinate treatment with the other members of the treatment team.\par The patient verbalized understanding of the management/plan rationale and agreed with my recommendations.\par \par

## 2023-05-17 NOTE — HISTORY OF PRESENT ILLNESS
[FreeTextEntry1] : Ms. AARON ARREOLA is a very pleasant 38 year female who seen for evaluation of low back pain left upper buttock post hip area pain  that has been ongoing for 3 weeks  without any specific injury or inciting event. The pain is located primarily left buttock intermittent in nature and described as sharp also left heel with downward dog burns ++ . The pain is rated as 3/10 during today's visit, and ranges from 0-6/10. The patient's symptoms are aggravated by laying flat or crossing legs she feels her left side is out of alignment   and alleviated by not sure . The patient denies any radiating symptoms to the lower extremities, numbness/tingling, weakness, or bowel/bladder dysfunction. The patient has no other complaints at this time.\par her neck is much better after PT \par she had similar issues to todays in Oct did not have pT \par \par

## 2023-05-17 NOTE — REVIEW OF SYSTEMS
[Muscle Pain] : muscle pain [Negative] : Heme/Lymph [Fever] : no fever [Lower Ext Edema] : no lower extremity edema [Muscle Weakness] : no muscle weakness [Difficulty Walking] : no difficulty walking

## 2023-05-17 NOTE — CONSULT LETTER
[FreeTextEntry1] : Dear Dr. EFRAIN ELIAS  , \par \par I had the pleasure of  re evaluating your patient, AARON ARREOLA .\par \par Thank you very much for allowing me to participate in the care of this patient. If you have any questions, please do not hesitate to contact me. \par \par Sincerely, \par Tammy Tilley MD \par \par ABPMR Board Certified in Physical Medicine and Rehabilitation\par Certified Fellow of AANEM (Neuromuscular and Electrodiagnostic Medicine)\par Subspecialty certified in Sports Medicine (ABPMR)\par \par  of Physical Medicine and Rehabilitation\par [facility]\par [facility]\par \par

## 2023-09-12 ENCOUNTER — NON-APPOINTMENT (OUTPATIENT)
Age: 38
End: 2023-09-12

## 2023-09-13 ENCOUNTER — APPOINTMENT (OUTPATIENT)
Dept: PHYSICAL MEDICINE AND REHAB | Facility: CLINIC | Age: 38
End: 2023-09-13
Payer: COMMERCIAL

## 2023-09-13 VITALS
BODY MASS INDEX: 24.33 KG/M2 | RESPIRATION RATE: 18 BRPM | HEART RATE: 58 BPM | SYSTOLIC BLOOD PRESSURE: 97 MMHG | DIASTOLIC BLOOD PRESSURE: 66 MMHG | HEIGHT: 67 IN | WEIGHT: 155 LBS

## 2023-09-13 DIAGNOSIS — M54.12 RADICULOPATHY, CERVICAL REGION: ICD-10-CM

## 2023-09-13 DIAGNOSIS — M79.2 NEURALGIA AND NEURITIS, UNSPECIFIED: ICD-10-CM

## 2023-09-13 DIAGNOSIS — M79.18 MYALGIA, OTHER SITE: ICD-10-CM

## 2023-09-13 DIAGNOSIS — M54.2 CERVICALGIA: ICD-10-CM

## 2023-09-13 PROCEDURE — 20552 NJX 1/MLT TRIGGER POINT 1/2: CPT

## 2023-09-13 PROCEDURE — 99214 OFFICE O/P EST MOD 30 MIN: CPT | Mod: 25

## 2023-09-13 RX ORDER — NAPROXEN 500 MG/1
500 TABLET ORAL TWICE DAILY
Qty: 60 | Refills: 0 | Status: ACTIVE | COMMUNITY
Start: 2023-09-13 | End: 1900-01-01

## 2023-11-02 NOTE — H&P PST ADULT - HISTORY OF PRESENT ILLNESS
negative
Patient reports she has had pelvic pain since age 14. She complains of painful menstruations, bilateral flank and leg moris.

## 2025-05-23 NOTE — ED ADULT NURSE NOTE - [id number]_ED
[FreeTextEntry3] : Hieu Davison MD I reviewed the history & physical exam of patient & discussed with the resident. Agree with assessment & management plan as documented in residents note and addendums made as needed above. Attending exam and counseling completed.  
No